# Patient Record
Sex: MALE | Race: WHITE | NOT HISPANIC OR LATINO | Employment: FULL TIME | URBAN - METROPOLITAN AREA
[De-identification: names, ages, dates, MRNs, and addresses within clinical notes are randomized per-mention and may not be internally consistent; named-entity substitution may affect disease eponyms.]

---

## 2017-01-23 DIAGNOSIS — C61 PROSTATE CANCER (HCC): Primary | ICD-10-CM

## 2017-01-24 ENCOUNTER — APPOINTMENT (OUTPATIENT)
Dept: LAB | Facility: HOSPITAL | Age: 52
End: 2017-01-24
Attending: RADIOLOGY

## 2017-01-24 PROCEDURE — 84153 ASSAY OF PSA TOTAL: CPT | Performed by: RADIOLOGY

## 2017-01-24 PROCEDURE — 36415 COLL VENOUS BLD VENIPUNCTURE: CPT | Performed by: RADIOLOGY

## 2017-01-24 PROCEDURE — 84154 ASSAY OF PSA FREE: CPT | Performed by: RADIOLOGY

## 2017-01-25 ENCOUNTER — OFFICE VISIT (OUTPATIENT)
Dept: RADIATION ONCOLOGY | Facility: HOSPITAL | Age: 52
End: 2017-01-25

## 2017-01-25 ENCOUNTER — HOSPITAL ENCOUNTER (OUTPATIENT)
Dept: RADIATION ONCOLOGY | Facility: HOSPITAL | Age: 52
Setting detail: RADIATION/ONCOLOGY SERIES
Discharge: HOME | End: 2017-01-25

## 2017-01-25 VITALS
DIASTOLIC BLOOD PRESSURE: 90 MMHG | BODY MASS INDEX: 31.33 KG/M2 | WEIGHT: 223.8 LBS | SYSTOLIC BLOOD PRESSURE: 145 MMHG | OXYGEN SATURATION: 94 % | RESPIRATION RATE: 16 BRPM | TEMPERATURE: 98 F | HEART RATE: 75 BPM | HEIGHT: 71 IN

## 2017-01-25 DIAGNOSIS — C61 PROSTATE CANCER (HCC): Primary | ICD-10-CM

## 2017-01-25 LAB
PSA FREE MFR SERPL: NORMAL %
PSA FREE SERPL-MCNC: <0.01 NG/ML
PSA SERPL-MCNC: <0.1 NG/ML (ref 0–4)

## 2017-01-25 PROCEDURE — G0463 HOSPITAL OUTPT CLINIC VISIT: HCPCS

## 2017-01-25 RX ORDER — GLUCOSAM/CHONDRO/HERB 149/HYAL 750-100 MG
TABLET ORAL
COMMUNITY
Start: 2016-06-01 | End: 2018-06-08

## 2017-01-25 NOTE — MR AVS SNAPSHOT
Abilio Wan   1/25/2017 2:45 PM   Office Visit    Dept Phone:  [phone]   Encounter #:  [id number]    Provider:  Royal Espinoza MD   Department:  [facility]                Your Full Care Plan              Today's Medication Changes          These changes are accurate as of: 1/25/17  3:15 PM.  If you have any questions, ask your nurse or doctor.               Stop taking medication(s)listed here:     bicalutamide 50 MG chemo tablet   Commonly known as:  CASODEX   Stopped by:  Royal Espinoza MD           dutasteride 0.5 MG capsule   Commonly known as:  AVODART   Stopped by:  Royal Espinoza MD           tamoxifen 20 MG chemo tablet   Commonly known as:  NOLVADEX   Stopped by:  Royal Espinoza MD                      Your Updated Medication List          This list is accurate as of: 1/25/17  3:15 PM.  Always use your most recent med list.                ASPIRIN ADULT LOW DOSE PO       CIALIS PO       GLUCOSAMINE CHOND COMPLEX/MSM tablet       LEVITRA PO       MULTIVITAMIN PO       VIAGRA PO       VITAMIN D PO               You Were Diagnosed With        Codes Comments    Prostate cancer    -  Primary ICD-10-CM: C61  ICD-9-CM: 185       Instructions     None    Patient Instructions History      Upcoming Appointments     Visit Type Date Time Department    RO FU > 6 MONTHS 1/25/2017  2:45 PM [facility]      [employer] Signup     Our records indicate that you have an active [employer] account.    You can view your After Visit Summary by going to [employer] and logging in with your [employer] username and password.  If you don't have a [employer] username and password but a parent or guardian has access to your record, the parent or guardian should login with their own [employer] username and password and access your record to view the After Visit Summary.    If you have questions, you can email [email or web address] or  "call [phone] to talk to our MyChart staff.  Remember, MyChart is NOT to be used for urgent needs.  For medical emergencies, dial 911.               Other Info from Your Visit           Allergies     No Known Allergies      Reason for Visit     Prostate Cancer IMRT prostate bed c/d 11/13/2015, last psa 1/24/17 was <0.1      Vital Signs     Blood Pressure Pulse Temperature Respirations Height Weight    145/90 75 98 °F (36.7 °C) (Oral) 16 71\" (180.3 cm) 223 lb 12.8 oz (102 kg)    Oxygen Saturation Body Mass Index Smoking Status             94% 31.21 kg/m2 Never Smoker         Problems and Diagnoses Noted     Prostate cancer        "

## 2017-01-25 NOTE — LETTER
2017     Fernando Pardo MD  [address]   [address]  [facility] [place] [address]    Patient: Abilio Wan   YOB: 1965   Date of Visit: 2017       Dear Dr. Char MD:    Abilio Wan was in my office today. Below is a copy of my note.    If you have questions, please do not hesitate to call me. I look forward to following Abilio along with you.         Sincerely,        Royal Espinoza MD        CC: No Recipients    FOLLOW UP NOTE    PATIENT:                                                      Abilio Wan  MEDICAL RECORD #:                        [id number]  :                                                          1965  COMPLETION DATE:   2015  DIAGNOSIS:     Prostate cancer    Staging form: Prostate, AJCC V7      Clinical stage from 2011: Stage III (rT3a, N0, M0, PSA: Less than 10, Harris 7)       BRIEF HISTORY:    Routine follow-up visit.  He has a history of prostate cancer, Harris's 6 status post prostatectomy but then developed biochemical recurrence treated with salvage pelvic irradiation and concurrent short course peripheral androgen blockade.  He continues to do well and denies any symptoms of fatigue, pain, bowel disturbance or new urinary symptoms.  PSA from 2017 is less than 0.1, undetectable.    MEDICATIONS: Medication reconciliation for the patient was reviewed and confirmed in the electronic medical record.    Review of Systems   All other systems reviewed and are negative.    IMPRESSION: IPSS Questionnaire (AUA-7):  Over the past month…     1) Incomplete Emptying  How often have you had a sensation of not emptying your bladder?  0 - Not at all   2) Frequency  How often have you had to urinate less than every two hours? 0 - Not at all   3) Intermittency  How often have you found you stopped and started again several times when you urinated?  0 - Not at all   4) Urgency  How often have you found it difficult to postpone  urination?  0 - Not at all   5) Weak Stream  How often have you had a weak urinary stream?  0 - Not at all   6) Straining  How often have you had to push or strain to begin urination?  0 - Not at all   7) Nocturia  How many times did you typically get up at night to urinate?  0 - None   Total Score: 0         Quality of life due to urinary symptoms:  If you were to spend the rest of your life with your urinary condition the way it is now, how would you feel about that? 0-Delighted   Urine Leakage (Incontinence) 0-No Leakage      Sexual Health Inventory  Current Status     1) How do you rate your confidence that you could achieve and keep an erection? 4-High   2) When you had erections with sexual stimulation, how often were your erections hard enough for penetration (entering your partner)? 4-Most times (much more than half the time)   3) During sexual intercourse, how often were you able to maintain your erection after you had penetrated (entered) into your partner? 3-Sometimes (about half the time)   4) During sexual intercourse, how difficult was it to maintain your erection to completion of intercourse? 5-Not difficult   5) When you attempted sexual intercourse, how often was it satisfactory to you? 5-Almost always or always   Total Score: 23         Bowel Health Inventory  Current Status: 0-No problems, no rectal bleeding, no discharge, less then 5 bowel movements a day          KPS 90%    Physical Exam   Constitutional: He is oriented to person, place, and time. He appears well-developed and well-nourished.   HENT:   Head: Normocephalic and atraumatic.   Neck: Normal range of motion. Neck supple.   Cardiovascular: Normal rate, regular rhythm and normal heart sounds.    No murmur heard.  Pulmonary/Chest: Effort normal and breath sounds normal. He has no wheezes. He has no rales.   Abdominal: Soft. Bowel sounds are normal. He exhibits no distension. There is no hepatosplenomegaly. There is no tenderness.  "  Musculoskeletal: Normal range of motion. He exhibits no edema or tenderness.   Lymphadenopathy:     He has no cervical adenopathy.     He has no axillary adenopathy.        Right: No supraclavicular adenopathy present.        Left: No supraclavicular adenopathy present.   Neurological: He is alert and oriented to person, place, and time. He has normal strength. No sensory deficit.   Skin: Skin is warm and dry.   Psychiatric: He has a normal mood and affect. His behavior is normal. Judgment and thought content normal.   Nursing note and vitals reviewed.      VITAL SIGNS:   Vitals:    01/25/17 1448   BP: 145/90   Pulse: 75   Resp: 16   Temp: 98 °F (36.7 °C)   TempSrc: Oral   SpO2: 94%   Weight: 223 lb 12.8 oz (102 kg)   Height: 71\" (180.3 cm)   PainSc: 0-No pain       The following portions of the patient's history were reviewed and updated as appropriate: allergies, current medications, past family history, past medical history, past social history, past surgical history and problem list.         Prostate cancer [C61]    IMPRESSION:  More than year after salvage pelvic irradiation for biochemical recurrence of prostate cancer he is doing well and is again in clinical remission.    RECOMMENDATIONS:  Repeat PSA in 6 months.     Return in about 6 months (around 7/25/2017) for Labs - See Treatment Plan, Office Visit.     Royal Espinoza MD    Errors in dictation may reflect use of voice recognition software and not all errors in transcription may have been detected prior to signing.  "

## 2017-01-25 NOTE — PROGRESS NOTES
FOLLOW UP NOTE    PATIENT:                                                      Abilio Wan  MEDICAL RECORD #:                        [id number]  :                                                          1965  COMPLETION DATE:   2015  DIAGNOSIS:     Prostate cancer    Staging form: Prostate, AJCC V7      Clinical stage from 2011: Stage III (rT3a, N0, M0, PSA: Less than 10, [place] 7)       BRIEF HISTORY:    Routine follow-up visit.  He has a history of prostate cancer, Harris's 6 status post prostatectomy but then developed biochemical recurrence treated with salvage pelvic irradiation and concurrent short course peripheral androgen blockade.  He continues to do well and denies any symptoms of fatigue, pain, bowel disturbance or new urinary symptoms.  PSA from 2017 is less than 0.1, undetectable.    MEDICATIONS: Medication reconciliation for the patient was reviewed and confirmed in the electronic medical record.    Review of Systems   All other systems reviewed and are negative.    IMPRESSION: IPSS Questionnaire (AUA-7):  Over the past month…     1) Incomplete Emptying  How often have you had a sensation of not emptying your bladder?  0 - Not at all   2) Frequency  How often have you had to urinate less than every two hours? 0 - Not at all   3) Intermittency  How often have you found you stopped and started again several times when you urinated?  0 - Not at all   4) Urgency  How often have you found it difficult to postpone urination?  0 - Not at all   5) Weak Stream  How often have you had a weak urinary stream?  0 - Not at all   6) Straining  How often have you had to push or strain to begin urination?  0 - Not at all   7) Nocturia  How many times did you typically get up at night to urinate?  0 - None   Total Score: 0         Quality of life due to urinary symptoms:  If you were to spend the rest of your life with your urinary condition the way it is now, how would you feel about  that? 0-Delighted   Urine Leakage (Incontinence) 0-No Leakage      Sexual Health Inventory  Current Status     1) How do you rate your confidence that you could achieve and keep an erection? 4-High   2) When you had erections with sexual stimulation, how often were your erections hard enough for penetration (entering your partner)? 4-Most times (much more than half the time)   3) During sexual intercourse, how often were you able to maintain your erection after you had penetrated (entered) into your partner? 3-Sometimes (about half the time)   4) During sexual intercourse, how difficult was it to maintain your erection to completion of intercourse? 5-Not difficult   5) When you attempted sexual intercourse, how often was it satisfactory to you? 5-Almost always or always   Total Score: 23         Bowel Health Inventory  Current Status: 0-No problems, no rectal bleeding, no discharge, less then 5 bowel movements a day          KPS 90%    Physical Exam   Constitutional: He is oriented to person, place, and time. He appears well-developed and well-nourished.   HENT:   Head: Normocephalic and atraumatic.   Neck: Normal range of motion. Neck supple.   Cardiovascular: Normal rate, regular rhythm and normal heart sounds.    No murmur heard.  Pulmonary/Chest: Effort normal and breath sounds normal. He has no wheezes. He has no rales.   Abdominal: Soft. Bowel sounds are normal. He exhibits no distension. There is no hepatosplenomegaly. There is no tenderness.   Musculoskeletal: Normal range of motion. He exhibits no edema or tenderness.   Lymphadenopathy:     He has no cervical adenopathy.     He has no axillary adenopathy.        Right: No supraclavicular adenopathy present.        Left: No supraclavicular adenopathy present.   Neurological: He is alert and oriented to person, place, and time. He has normal strength. No sensory deficit.   Skin: Skin is warm and dry.   Psychiatric: He has a normal mood and affect. His  "behavior is normal. Judgment and thought content normal.   Nursing note and vitals reviewed.      VITAL SIGNS:   Vitals:    01/25/17 1448   BP: 145/90   Pulse: 75   Resp: 16   Temp: 98 °F (36.7 °C)   TempSrc: Oral   SpO2: 94%   Weight: 223 lb 12.8 oz (102 kg)   Height: 71\" (180.3 cm)   PainSc: 0-No pain       The following portions of the patient's history were reviewed and updated as appropriate: allergies, current medications, past family history, past medical history, past social history, past surgical history and problem list.         Prostate cancer [C61]    IMPRESSION:  More than year after salvage pelvic irradiation for biochemical recurrence of prostate cancer he is doing well and is again in clinical remission.    RECOMMENDATIONS:  Repeat PSA in 6 months.     Return in about 6 months (around 7/25/2017) for Labs - See Treatment Plan, Office Visit.     Royal Espinoza MD    Errors in dictation may reflect use of voice recognition software and not all errors in transcription may have been detected prior to signing.  "

## 2017-07-26 ENCOUNTER — OFFICE VISIT (OUTPATIENT)
Dept: RADIATION ONCOLOGY | Facility: HOSPITAL | Age: 52
End: 2017-07-26

## 2017-07-26 ENCOUNTER — HOSPITAL ENCOUNTER (OUTPATIENT)
Dept: RADIATION ONCOLOGY | Facility: HOSPITAL | Age: 52
Setting detail: RADIATION/ONCOLOGY SERIES
Discharge: HOME | End: 2017-07-26

## 2017-07-26 VITALS
HEART RATE: 68 BPM | RESPIRATION RATE: 20 BRPM | OXYGEN SATURATION: 95 % | BODY MASS INDEX: 30.47 KG/M2 | SYSTOLIC BLOOD PRESSURE: 137 MMHG | WEIGHT: 218.5 LBS | DIASTOLIC BLOOD PRESSURE: 92 MMHG | TEMPERATURE: 97 F

## 2017-07-26 DIAGNOSIS — C61 PROSTATE CANCER (HCC): Primary | ICD-10-CM

## 2017-07-26 LAB — PSA SERPL-MCNC: 0.03 NG/ML (ref 0–4)

## 2017-07-26 PROCEDURE — 84153 ASSAY OF PSA TOTAL: CPT | Performed by: RADIOLOGY

## 2017-07-26 PROCEDURE — G0463 HOSPITAL OUTPT CLINIC VISIT: HCPCS

## 2017-07-26 NOTE — PROGRESS NOTES
FOLLOW UP NOTE    PATIENT:                                                      Abilio Wan  MEDICAL RECORD #:                        [id number]  :                                                          1965  COMPLETION DATE:    11/13/15  DIAGNOSIS:     Prostate cancer    Staging form: Prostate, AJCC V7      Clinical stage from 2011: Stage III (rT3a, N0, M0, PSA: Less than 10, Harris 7) - Signed by Royal Espinoza MD on 2016      BRIEF HISTORY:    Routine follow-up visit.  He is status post pelvic irradiation for biochemical recurrence after prostatectomy.  He's doing well with no complaints or symptoms at all.  Energy is good.  He is working, golfing and very active.  Androgen ablation injection appears to have worn off but he has not had testosterone retested.  He denies any hot flashes, pains or other complaints.  Bowel and bladder function is normal.  No incontinence.  No ED.    PSA drawn today was 0.030 ng/ml.  The previous 2 PSA values had been less than 0.1 ng/ml.    MEDICATIONS: Medication reconciliation for the patient was reviewed and confirmed in the electronic medical record.    Review of Systems   Constitutional: Negative.    HENT:   Positive for hearing loss.    Eyes: Negative.    Respiratory: Negative.    Cardiovascular: Negative.    Gastrointestinal: Negative.    Endocrine: Negative.    Genitourinary: Negative.     Musculoskeletal: Negative.    Skin: Negative.    Neurological: Negative.    Hematological: Negative.    Psychiatric/Behavioral: Negative.         IMPRESSION: IPSS Questionnaire (AUA-7):  Over the past month…      1) Incomplete Emptying  How often have you had a sensation of not emptying your bladder?  0 - Not at all   2) Frequency  How often have you had to urinate less than every two hours? 0 - Not at all   3) Intermittency  How often have you found you stopped and started again several times when you urinated?  0 - Not at all   4) Urgency  How often have you  found it difficult to postpone urination?  0 - Not at all   5) Weak Stream  How often have you had a weak urinary stream?  0 - Not at all   6) Straining  How often have you had to push or strain to begin urination?  0 - Not at all   7) Nocturia  How many times did you typically get up at night to urinate?  0 - None   Total Score: 0           Quality of life due to urinary symptoms:  If you were to spend the rest of your life with your urinary condition the way it is now, how would you feel about that? 0-Delighted   Urine Leakage (Incontinence) 0-No Leakage       Sexual Health Inventory  Current Status      1) How do you rate your confidence that you could achieve and keep an erection? 4-High   2) When you had erections with sexual stimulation, how often were your erections hard enough for penetration (entering your partner)? 4-Most times (much more than half the time)   3) During sexual intercourse, how often were you able to maintain your erection after you had penetrated (entered) into your partner? 3-Sometimes (about half the time)   4) During sexual intercourse, how difficult was it to maintain your erection to completion of intercourse? 5-Not difficult   5) When you attempted sexual intercourse, how often was it satisfactory to you? 5-Almost always or always   Total Score: 23           Bowel Health Inventory  Current Status: 0-No problems, no rectal bleeding, no discharge, less then 5 bowel movements a day               KPS 90%    Physical Exam   Constitutional: He is oriented to person, place, and time. He appears well-developed and well-nourished.   HENT:   Head: Normocephalic and atraumatic.   Neck: Normal range of motion. Neck supple.   Cardiovascular: Normal rate, regular rhythm and normal heart sounds.    No murmur heard.  Pulmonary/Chest: Effort normal and breath sounds normal. He has no wheezes. He has no rales.   Abdominal: Soft. Bowel sounds are normal. He exhibits no distension. There is no  hepatosplenomegaly. There is no tenderness.   Genitourinary: Rectal exam shows no internal hemorrhoid, no mass, no tenderness and anal tone normal. External hemorrhoid: external hemorrhoidal tissue is noninflamed. Prostate is not enlarged (prostate bed is smooth, soft without nodules) and not tender.   Musculoskeletal: Normal range of motion. He exhibits no edema or tenderness.   Lymphadenopathy:     He has no cervical adenopathy.     He has no axillary adenopathy.        Right: No supraclavicular adenopathy present.        Left: No supraclavicular adenopathy present.   Neurological: He is alert and oriented to person, place, and time. He has normal strength. No sensory deficit.   Skin: Skin is warm and dry.   Psychiatric: He has a normal mood and affect. His behavior is normal. Judgment and thought content normal.   Nursing note and vitals reviewed.      VITAL SIGNS:   Vitals:    07/26/17 0941   BP: 137/92   Pulse: 68   Resp: 20   Temp: 97 °F (36.1 °C)   TempSrc: Temporal Artery    SpO2: 95%   Weight: 218 lb 8 oz (99.1 kg)   PainSc: 0-No pain       The following portions of the patient's history were reviewed and updated as appropriate: allergies, current medications, past family history, past medical history, past social history, past surgical history and problem list.         Abilio was seen today for prostate cancer.    Diagnoses and all orders for this visit:    Prostate cancer  -     PSA         IMPRESSION:  Prostate cancer, biochemical recurrence, one half years after salvage pelvic irradiation with short course of androgen ablation.  He's clinically doing well with no symptoms or complaints.  PSA is barely detectable with our new laboratory standard.  It had previously been reported as less than 0.1 ng/ml and now has a value 0.030 ng/ml.    RECOMMENDATIONS:  Repeat PSA in 3 months to confirm stability.    Return in about 3 months (around 10/26/2017) for Office Visit, Labs - See Treatment Plan.    Royal VASQUES  MD [place]    Errors in dictation may reflect use of voice recognition software and not all errors in transcription may have been detected prior to signing.

## 2017-10-23 DIAGNOSIS — C61 PROSTATE CANCER (HCC): Primary | ICD-10-CM

## 2017-10-24 ENCOUNTER — LAB (OUTPATIENT)
Dept: LAB | Facility: HOSPITAL | Age: 52
End: 2017-10-24

## 2017-10-24 DIAGNOSIS — C61 PROSTATE CANCER (HCC): ICD-10-CM

## 2017-10-24 LAB — PSA SERPL-MCNC: 0.07 NG/ML (ref 0–4)

## 2017-10-24 PROCEDURE — 36415 COLL VENOUS BLD VENIPUNCTURE: CPT

## 2017-10-24 PROCEDURE — 84153 ASSAY OF PSA TOTAL: CPT | Performed by: RADIOLOGY

## 2017-10-26 ENCOUNTER — OFFICE VISIT (OUTPATIENT)
Dept: RADIATION ONCOLOGY | Facility: HOSPITAL | Age: 52
End: 2017-10-26

## 2017-10-26 ENCOUNTER — HOSPITAL ENCOUNTER (OUTPATIENT)
Dept: RADIATION ONCOLOGY | Facility: HOSPITAL | Age: 52
Setting detail: RADIATION/ONCOLOGY SERIES
Discharge: HOME | End: 2017-10-26

## 2017-10-26 VITALS
OXYGEN SATURATION: 97 % | DIASTOLIC BLOOD PRESSURE: 100 MMHG | BODY MASS INDEX: 31.52 KG/M2 | RESPIRATION RATE: 20 BRPM | HEART RATE: 85 BPM | TEMPERATURE: 97.3 F | WEIGHT: 226 LBS | SYSTOLIC BLOOD PRESSURE: 151 MMHG

## 2017-10-26 DIAGNOSIS — C61 PROSTATE CANCER (HCC): Primary | ICD-10-CM

## 2017-10-26 PROCEDURE — G0463 HOSPITAL OUTPT CLINIC VISIT: HCPCS

## 2017-10-26 NOTE — PROGRESS NOTES
FOLLOW UP NOTE    PATIENT:                                                      Abilio Wan  MEDICAL RECORD #:                        [id number]  :                                                          1965  COMPLETION DATE:    11/13/15  DIAGNOSIS:     Prostate cancer    Staging form: Prostate, AJCC V7    - Clinical stage from 2011: Stage III (rT3a, N0, M0, PSA: Less than 10, Harris 7) - Signed by Royal Espinoza MD on 2016      BRIEF HISTORY:    Routine follow-up visit.  He status post prostatectomy 2011.  He developed subsequent biochemical recurrence and received salvage pelvic irradiation with short course hormonal therapy completing 2015.  He feels well and has no complaints.  Recent PSA was 0.07 ng/ml.    PSA 3 months ago was 0.03 ng/ml.    MEDICATIONS: Medication reconciliation for the patient was reviewed and confirmed in the electronic medical record.    Review of Systems   Constitutional: Negative.    HENT:   Positive for hearing loss and tinnitus.    Eyes: Negative.    Respiratory: Negative.    Cardiovascular: Negative.    Gastrointestinal: Negative.    Endocrine: Negative.    Genitourinary: Negative.     Musculoskeletal: Negative.    Skin: Negative.    Neurological: Negative.    Hematological: Negative.    Psychiatric/Behavioral: Negative.           IMPRESSION: IPSS Questionnaire (AUA-7):  Over the past month…      1) Incomplete Emptying  How often have you had a sensation of not emptying your bladder?  0 - Not at all   2) Frequency  How often have you had to urinate less than every two hours? 0 - Not at all   3) Intermittency  How often have you found you stopped and started again several times when you urinated?  0 - Not at all   4) Urgency  How often have you found it difficult to postpone urination?  0 - Not at all   5) Weak Stream  How often have you had a weak urinary stream?  0 - Not at all   6) Straining  How often have you had to push or strain to  begin urination?  0 - Not at all   7) Nocturia  How many times did you typically get up at night to urinate?  0 - None   Total Score: 0           Quality of life due to urinary symptoms:  If you were to spend the rest of your life with your urinary condition the way it is now, how would you feel about that? 0-Delighted   Urine Leakage (Incontinence) 0-No Leakage       Sexual Health Inventory  Current Status      1) How do you rate your confidence that you could achieve and keep an erection? 4-High   2) When you had erections with sexual stimulation, how often were your erections hard enough for penetration (entering your partner)? 4-Most times (much more than half the time)   3) During sexual intercourse, how often were you able to maintain your erection after you had penetrated (entered) into your partner? 3-Sometimes (about half the time)   4) During sexual intercourse, how difficult was it to maintain your erection to completion of intercourse? 5-Not difficult   5) When you attempted sexual intercourse, how often was it satisfactory to you? 5-Almost always or always   Total Score: 23           Bowel Health Inventory  Current Status: 0-No problems, no rectal bleeding, no discharge, less then 5 bowel movements a day                KPS 90%    Physical Exam   Constitutional: He is oriented to person, place, and time. He appears well-developed and well-nourished.   HENT:   Head: Normocephalic and atraumatic.   Neck: Normal range of motion. Neck supple.   Cardiovascular: Normal rate, regular rhythm and normal heart sounds.    No murmur heard.  Pulmonary/Chest: Effort normal and breath sounds normal. He has no wheezes. He has no rales.   Abdominal: Soft. Bowel sounds are normal. He exhibits no distension. There is no hepatosplenomegaly. There is no tenderness.   Musculoskeletal: Normal range of motion. He exhibits no edema or tenderness.   Lymphadenopathy:     He has no cervical adenopathy.     He has no axillary  adenopathy.        Right: No supraclavicular adenopathy present.        Left: No supraclavicular adenopathy present.   Neurological: He is alert and oriented to person, place, and time. He has normal strength. No sensory deficit.   Skin: Skin is warm and dry.   Psychiatric: He has a normal mood and affect. His behavior is normal. Judgment and thought content normal.   Nursing note and vitals reviewed.      VITAL SIGNS:   Vitals:    10/26/17 0924   BP: 151/100   Pulse: 85   Resp: 20   Temp: 97.3 °F (36.3 °C)   TempSrc: Temporal Artery    SpO2: 97%   Weight: 226 lb (103 kg)   PainSc: 0-No pain       The following portions of the patient's history were reviewed and updated as appropriate: allergies, current medications, past family history, past medical history, past social history, past surgical history and problem list.         Abilio was seen today for prostate cancer.    Diagnoses and all orders for this visit:    Prostate cancer  -     PSA; Future       IMPRESSION:  Prostate cancer, almost 6 years after prostatectomy and not quite 2 years after salvage pelvic irradiation/short course androgen ablation.  PSA is extremely low and barely detectable with very sensitive assay.  Radiographic workup would not likely be able to accurately detect the source of this PSA, even with new or PET/CT modalities.  Imaging studies are not indicated at this time.  Treatment intervention is not warranted.    RECOMMENDATIONS:  Continue surveillance with repeat PSA testing in 3 months.    Return in about 3 months (around 1/25/2018) for Labs - See Treatment Plan, Office Visit.    Royal Espinoza MD    Errors in dictation may reflect use of voice recognition software and not all errors in transcription may have been detected prior to signing.

## 2018-01-09 DIAGNOSIS — C61 PROSTATE CANCER (HCC): Primary | ICD-10-CM

## 2018-01-09 RX ORDER — SILDENAFIL 100 MG/1
100 TABLET, FILM COATED ORAL DAILY PRN
Qty: 30 TABLET | Refills: 11 | Status: SHIPPED | OUTPATIENT
Start: 2018-01-09 | End: 2018-07-17 | Stop reason: SDUPTHER

## 2018-01-29 ENCOUNTER — LAB (OUTPATIENT)
Dept: LAB | Facility: HOSPITAL | Age: 53
End: 2018-01-29

## 2018-01-29 DIAGNOSIS — C61 PROSTATE CANCER (HCC): Primary | ICD-10-CM

## 2018-01-29 DIAGNOSIS — C61 PROSTATE CANCER (HCC): ICD-10-CM

## 2018-01-29 LAB — PSA SERPL-MCNC: 0.02 NG/ML (ref 0–4)

## 2018-01-29 PROCEDURE — 36415 COLL VENOUS BLD VENIPUNCTURE: CPT

## 2018-01-29 PROCEDURE — 84153 ASSAY OF PSA TOTAL: CPT

## 2018-01-30 DIAGNOSIS — C61 PROSTATE CANCER (HCC): Primary | ICD-10-CM

## 2018-06-01 DIAGNOSIS — C61 PROSTATE CANCER (HCC): Primary | ICD-10-CM

## 2018-06-06 ENCOUNTER — LAB (OUTPATIENT)
Dept: LAB | Facility: HOSPITAL | Age: 53
End: 2018-06-06

## 2018-06-06 DIAGNOSIS — C61 PROSTATE CANCER (HCC): ICD-10-CM

## 2018-06-06 LAB — PSA SERPL-MCNC: 0.04 NG/ML (ref 0–4)

## 2018-06-06 PROCEDURE — G0103 PSA SCREENING: HCPCS

## 2018-06-06 PROCEDURE — 36415 COLL VENOUS BLD VENIPUNCTURE: CPT

## 2018-06-08 ENCOUNTER — OFFICE VISIT (OUTPATIENT)
Dept: RADIATION ONCOLOGY | Facility: HOSPITAL | Age: 53
End: 2018-06-08

## 2018-06-08 ENCOUNTER — HOSPITAL ENCOUNTER (OUTPATIENT)
Dept: RADIATION ONCOLOGY | Facility: HOSPITAL | Age: 53
Setting detail: RADIATION/ONCOLOGY SERIES
Discharge: HOME | End: 2018-06-08

## 2018-06-08 VITALS
TEMPERATURE: 97.1 F | SYSTOLIC BLOOD PRESSURE: 127 MMHG | HEART RATE: 71 BPM | RESPIRATION RATE: 20 BRPM | OXYGEN SATURATION: 97 % | BODY MASS INDEX: 30.34 KG/M2 | DIASTOLIC BLOOD PRESSURE: 78 MMHG | WEIGHT: 217.5 LBS

## 2018-06-08 DIAGNOSIS — C61 PROSTATE CANCER (HCC): Primary | ICD-10-CM

## 2018-06-08 PROCEDURE — G0463 HOSPITAL OUTPT CLINIC VISIT: HCPCS

## 2018-06-08 RX ORDER — SILDENAFIL CITRATE 20 MG/1
20 TABLET ORAL DAILY PRN
Qty: 90 TABLET | Refills: 3 | Status: SHIPPED | OUTPATIENT
Start: 2018-06-08 | End: 2019-01-07 | Stop reason: SDUPTHER

## 2018-06-08 NOTE — PROGRESS NOTES
FOLLOW UP NOTE    PATIENT:                                                      Abilio Wan  MEDICAL RECORD #:                        [id number]  :                                                          1965  COMPLETION DATE:   11/13/15  DIAGNOSIS:     Prostate cancer  Staging form: Prostate, AJCC V7  - Clinical stage from 2011: Stage III (rT3a, N0, M0, PSA: Less than 10, Harris 7)       BRIEF HISTORY:    Routine follow-up visit.   He is status post prostatectomy 2011.    He developed subsequent biochemical recurrence with PSA 0.21 ng/ml.    He was treated with salvage pelvic irradiation with short course hormonal therapy completing 2015.  He feels well and has no complaints.  PSA:  0.07 ng/ml 10-24-17  0.02 ng/ml  18  0.04 ng/ml   18     MEDICATIONS: Medication reconciliation for the patient was reviewed and confirmed in the electronic medical record.    Review of Systems   Constitutional: Negative.    HENT:  Negative.    Eyes: Negative.    Respiratory: Negative.    Cardiovascular: Negative.    Gastrointestinal: Negative.    Endocrine: Negative.    Genitourinary: Negative.     Musculoskeletal: Negative.    Skin: Negative.    Neurological: Negative.    Hematological: Negative.    Psychiatric/Behavioral: Negative.          IMPRESSION: IPSS Questionnaire (AUA-7):  Over the past month…      1) Incomplete Emptying  How often have you had a sensation of not emptying your bladder?  0 - Not at all   2) Frequency  How often have you had to urinate less than every two hours? 0 - Not at all   3) Intermittency  How often have you found you stopped and started again several times when you urinated?  0 - Not at all   4) Urgency  How often have you found it difficult to postpone urination?  0 - Not at all   5) Weak Stream  How often have you had a weak urinary stream?  0 - Not at all   6) Straining  How often have you had to push or strain to begin urination?  0 - Not at all   7)  Nocturia  How many times did you typically get up at night to urinate?  0 - None   Total Score: 0           Quality of life due to urinary symptoms:  If you were to spend the rest of your life with your urinary condition the way it is now, how would you feel about that? 0-Delighted   Urine Leakage (Incontinence) 0-No Leakage       Sexual Health Inventory  Current Status      1) How do you rate your confidence that you could achieve and keep an erection? 4-High   2) When you had erections with sexual stimulation, how often were your erections hard enough for penetration (entering your partner)? 4-Most times (much more than half the time)   3) During sexual intercourse, how often were you able to maintain your erection after you had penetrated (entered) into your partner? 3-Sometimes (about half the time)   4) During sexual intercourse, how difficult was it to maintain your erection to completion of intercourse? 5-Not difficult   5) When you attempted sexual intercourse, how often was it satisfactory to you? 5-Almost always or always   Total Score: 23           Bowel Health Inventory  Current Status: 0-No problems, no rectal bleeding, no discharge, less then 5 bowel movements a day              KPS 90%    Physical Exam   Constitutional: He is oriented to person, place, and time. He appears well-developed and well-nourished.   HENT:   Head: Normocephalic and atraumatic.   Neck: Normal range of motion. Neck supple.   Cardiovascular: Normal rate, regular rhythm and normal heart sounds.    No murmur heard.  Pulmonary/Chest: Effort normal and breath sounds normal. He has no wheezes. He has no rales.   Abdominal: Soft. Bowel sounds are normal. He exhibits no distension. There is no hepatosplenomegaly. There is no tenderness.   Genitourinary: Rectal exam shows external hemorrhoid (noninflamed tags). Rectal exam shows no mass and no tenderness. Prostate is not enlarged (prostate bed is flat, smooth, no nodules.) and not  tender.   Musculoskeletal: Normal range of motion. He exhibits no edema or tenderness.   Lymphadenopathy:     He has no cervical adenopathy.     He has no axillary adenopathy.        Right: No supraclavicular adenopathy present.        Left: No supraclavicular adenopathy present.   Neurological: He is alert and oriented to person, place, and time. He has normal strength. No sensory deficit.   Skin: Skin is warm and dry.   Psychiatric: He has a normal mood and affect. His behavior is normal. Judgment and thought content normal.   Nursing note and vitals reviewed.      VITAL SIGNS:   Vitals:    06/08/18 0821   BP: 127/78   Pulse: 71   Resp: 20   Temp: 97.1 °F (36.2 °C)   TempSrc: Temporal Artery    SpO2: 97%   Weight: 98.7 kg (217 lb 8 oz)   PainSc: 0-No pain       The following portions of the patient's history were reviewed and updated as appropriate: allergies, current medications, past family history, past medical history, past social history, past surgical history and problem list.         Abilio was seen today for prostate cancer.    Diagnoses and all orders for this visit:    Prostate cancer    Other orders  -     sildenafil (REVATIO) 20 MG tablet; Take 1 tablet by mouth Daily As Needed (1-5 as directed).      IMPRESSION:  Prostate cancer, almost 7 years after prostatectomy and 2 1/2 years after salvage pelvic irradiation/short course androgen ablation.  PSA is extremely low and barely detectable with very sensitive assay.  Radiographic workup would not likely be able to accurately detect the source of this PSA, even with new or PET/CT modalities.  Imaging studies are not indicated at this time.  Treatment intervention is not warranted.    RECOMMENDATIONS:  Continue surveillance with repeat PSA testing and follow-up in 6 months.    Return in about 6 months (around 12/8/2018) for Labs - See Treatment Plan, Office Visit.    Royal Espinoza MD    Errors in dictation may reflect use of voice recognition software  and not all errors in transcription may have been detected prior to signing.

## 2018-07-17 ENCOUNTER — OFFICE VISIT (OUTPATIENT)
Dept: FAMILY MEDICINE CLINIC | Facility: CLINIC | Age: 53
End: 2018-07-17

## 2018-07-17 VITALS
HEIGHT: 71 IN | OXYGEN SATURATION: 98 % | TEMPERATURE: 97.8 F | BODY MASS INDEX: 30.94 KG/M2 | WEIGHT: 221 LBS | HEART RATE: 73 BPM | DIASTOLIC BLOOD PRESSURE: 80 MMHG | SYSTOLIC BLOOD PRESSURE: 122 MMHG

## 2018-07-17 DIAGNOSIS — Z92.3 S/P RADIOTHERAPY: ICD-10-CM

## 2018-07-17 DIAGNOSIS — Z85.46 HISTORY OF PROSTATE CANCER: Primary | ICD-10-CM

## 2018-07-17 DIAGNOSIS — Z90.79 S/P PROSTATECTOMY: ICD-10-CM

## 2018-07-17 PROCEDURE — 99214 OFFICE O/P EST MOD 30 MIN: CPT | Performed by: FAMILY MEDICINE

## 2018-07-17 NOTE — PROGRESS NOTES
Subjective   Abilio Wan is a 53 y.o. male    Patient presents today to establish care.  He is a former patient of Dr. Pardo.  Significant history is that of prostate cancer status post prostatectomy with chemical recurrence followed by radiation therapy.  He has no other chronic medical problems.  Previous surgery history includes amputation  of toe of the right foot, tonsillectomy, vasectomy and repair of Achilles heel tear.  Patient had a colonoscopy in 2015.  He has a history of colon polyps.  He has no acute medical complaints today.  He is due to schedule an annual physical exam which we get scheduled today.  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    Medical history notes reviewed.        The following portions of the patient's history were reviewed and updated as appropriate: allergies, current medications, past social history and problem list    Review of Systems   Constitutional: Negative.    HENT: Negative.    Respiratory: Negative.    Cardiovascular: Negative.    Gastrointestinal: Negative.    Genitourinary: Negative.    Musculoskeletal: Negative.    Psychiatric/Behavioral: Negative.        Objective     Vitals:    07/17/18 1300   BP: 122/80   Pulse: 73   Temp: 97.8 °F (36.6 °C)   SpO2: 98%       Physical Exam   Constitutional: He is oriented to person, place, and time. He appears well-developed and well-nourished.   HENT:   Head: Normocephalic.   Mouth/Throat: Oropharynx is clear and moist.   Eyes: Pupils are equal, round, and reactive to light. Conjunctivae are normal.   Cardiovascular: Normal rate and regular rhythm.    Pulmonary/Chest: Effort normal and breath sounds normal.   Neurological: He is alert and oriented to person, place, and time.   Skin: Skin is warm and dry.   Psychiatric: He has a normal mood and affect. His behavior is normal.   Nursing note and vitals reviewed.      Assessment/Plan   Problem List Items Addressed This Visit     None      Visit Diagnoses      History of prostate cancer    -  Primary    S/P prostatectomy        S/P radiotherapy

## 2018-09-13 ENCOUNTER — LAB (OUTPATIENT)
Dept: LAB | Facility: HOSPITAL | Age: 53
End: 2018-09-13

## 2018-09-13 ENCOUNTER — OFFICE VISIT (OUTPATIENT)
Dept: FAMILY MEDICINE CLINIC | Facility: CLINIC | Age: 53
End: 2018-09-13

## 2018-09-13 VITALS
HEIGHT: 71 IN | SYSTOLIC BLOOD PRESSURE: 120 MMHG | BODY MASS INDEX: 30.94 KG/M2 | WEIGHT: 221 LBS | DIASTOLIC BLOOD PRESSURE: 74 MMHG | TEMPERATURE: 98 F | OXYGEN SATURATION: 98 % | HEART RATE: 65 BPM

## 2018-09-13 DIAGNOSIS — Z11.59 NEED FOR HEPATITIS C SCREENING TEST: ICD-10-CM

## 2018-09-13 DIAGNOSIS — Z00.00 ROUTINE GENERAL MEDICAL EXAMINATION AT A HEALTH CARE FACILITY: ICD-10-CM

## 2018-09-13 DIAGNOSIS — Z85.46 HISTORY OF PROSTATE CANCER: ICD-10-CM

## 2018-09-13 DIAGNOSIS — Z00.00 ROUTINE GENERAL MEDICAL EXAMINATION AT A HEALTH CARE FACILITY: Primary | ICD-10-CM

## 2018-09-13 LAB
ALBUMIN SERPL-MCNC: 4.65 G/DL (ref 3.2–4.8)
ALBUMIN/GLOB SERPL: 2.4 G/DL (ref 1.5–2.5)
ALP SERPL-CCNC: 78 U/L (ref 25–100)
ALT SERPL W P-5'-P-CCNC: 23 U/L (ref 7–40)
ANION GAP SERPL CALCULATED.3IONS-SCNC: 9 MMOL/L (ref 3–11)
ARTICHOKE IGE QN: 135 MG/DL (ref 0–130)
AST SERPL-CCNC: 22 U/L (ref 0–33)
BILIRUB SERPL-MCNC: 0.5 MG/DL (ref 0.3–1.2)
BUN BLD-MCNC: 14 MG/DL (ref 9–23)
BUN/CREAT SERPL: 17.3 (ref 7–25)
CALCIUM SPEC-SCNC: 9.3 MG/DL (ref 8.7–10.4)
CHLORIDE SERPL-SCNC: 102 MMOL/L (ref 99–109)
CHOLEST SERPL-MCNC: 188 MG/DL (ref 0–200)
CO2 SERPL-SCNC: 27 MMOL/L (ref 20–31)
CREAT BLD-MCNC: 0.81 MG/DL (ref 0.6–1.3)
DEPRECATED RDW RBC AUTO: 38.3 FL (ref 37–54)
ERYTHROCYTE [DISTWIDTH] IN BLOOD BY AUTOMATED COUNT: 12.8 % (ref 11.3–14.5)
GFR SERPL CREATININE-BSD FRML MDRD: 100 ML/MIN/1.73
GLOBULIN UR ELPH-MCNC: 2 GM/DL
GLUCOSE BLD-MCNC: 77 MG/DL (ref 70–100)
HCT VFR BLD AUTO: 45.4 % (ref 38.9–50.9)
HCV AB SER DONR QL: NORMAL
HDLC SERPL-MCNC: 44 MG/DL (ref 40–60)
HGB BLD-MCNC: 15.6 G/DL (ref 13.1–17.5)
MCH RBC QN AUTO: 28.5 PG (ref 27–31)
MCHC RBC AUTO-ENTMCNC: 34.4 G/DL (ref 32–36)
MCV RBC AUTO: 82.8 FL (ref 80–99)
PLATELET # BLD AUTO: 243 10*3/MM3 (ref 150–450)
PMV BLD AUTO: 10.2 FL (ref 6–12)
POTASSIUM BLD-SCNC: 4.2 MMOL/L (ref 3.5–5.5)
PROT SERPL-MCNC: 6.6 G/DL (ref 5.7–8.2)
RBC # BLD AUTO: 5.48 10*6/MM3 (ref 4.2–5.76)
SODIUM BLD-SCNC: 138 MMOL/L (ref 132–146)
T4 FREE SERPL-MCNC: 1.28 NG/DL (ref 0.89–1.76)
TRIGL SERPL-MCNC: 158 MG/DL (ref 0–150)
TSH SERPL DL<=0.05 MIU/L-ACNC: 1.89 MIU/ML (ref 0.35–5.35)
WBC NRBC COR # BLD: 3.74 10*3/MM3 (ref 3.5–10.8)

## 2018-09-13 PROCEDURE — 80053 COMPREHEN METABOLIC PANEL: CPT

## 2018-09-13 PROCEDURE — 85027 COMPLETE CBC AUTOMATED: CPT

## 2018-09-13 PROCEDURE — 80061 LIPID PANEL: CPT

## 2018-09-13 PROCEDURE — 84439 ASSAY OF FREE THYROXINE: CPT

## 2018-09-13 PROCEDURE — 99396 PREV VISIT EST AGE 40-64: CPT | Performed by: FAMILY MEDICINE

## 2018-09-13 PROCEDURE — 36415 COLL VENOUS BLD VENIPUNCTURE: CPT

## 2018-09-13 PROCEDURE — 86803 HEPATITIS C AB TEST: CPT

## 2018-09-13 PROCEDURE — 84443 ASSAY THYROID STIM HORMONE: CPT

## 2018-09-13 NOTE — PROGRESS NOTES
Subjective   Abilio Wan is a 53 y.o. male    Chief Complaint  Annual physical exam    History of Present Illness  Patient presents today for annual physical examination.  He has no acute medical problems or complaints.  He has a history of prostate cancer and is status post prostatectomy.  He had a biochemical recurrence and underwent radiation therapy and a short course of hormonal ablation therapy.  He is followed closely by Dr. Riojas in radiation medicine.  Health maintenance issues are reviewed and discussed with the patient.  We will obtain labs when he is fasting.  He is advised to get a flu shot in October.    The following portions of the patient's history were reviewed and updated as appropriate: allergies, current medications, past social history and problem list    Review of Systems   Constitutional: Negative.    HENT: Negative.    Eyes: Negative.    Respiratory: Negative.    Cardiovascular: Negative.    Gastrointestinal: Negative.    Endocrine: Negative.    Genitourinary: Negative.    Musculoskeletal: Negative.    Skin: Negative.    Allergic/Immunologic: Negative.    Neurological: Negative.    Hematological: Negative.    Psychiatric/Behavioral: Negative.    All other systems reviewed and are negative.      Objective     Vitals:    09/13/18 0904   BP: 120/74   Pulse: 65   Temp: 98 °F (36.7 °C)   SpO2: 98%       Physical Exam   Constitutional: He is oriented to person, place, and time. He appears well-developed and well-nourished.   HENT:   Head: Normocephalic and atraumatic.   Right Ear: External ear normal.   Left Ear: External ear normal.   Nose: Nose normal.   Mouth/Throat: Oropharynx is clear and moist.   Eyes: Pupils are equal, round, and reactive to light. Conjunctivae and EOM are normal.   Neck: Normal range of motion. Neck supple. No thyromegaly present.   Cardiovascular: Normal rate, regular rhythm, normal heart sounds and intact distal pulses.    No murmur heard.  Pulmonary/Chest:  Effort normal and breath sounds normal.   Abdominal: Soft. Bowel sounds are normal. He exhibits no mass. There is no tenderness.   Genitourinary: Rectum normal, prostate normal and penis normal.   Musculoskeletal: Normal range of motion. He exhibits no edema.   Neurological: He is alert and oriented to person, place, and time. He has normal reflexes. No cranial nerve deficit.   Skin: Skin is warm and dry.   Psychiatric: He has a normal mood and affect. His behavior is normal.       Assessment/Plan   Problem List Items Addressed This Visit     None      Visit Diagnoses     Routine general medical examination at a health care facility    -  Primary    Relevant Orders    CBC (No Diff)    Comprehensive Metabolic Panel    Lipid Panel    TSH    T4, Free    History of prostate cancer        Need for hepatitis C screening test        Relevant Orders    Hepatitis C Antibody        Patient is counseled during today's visit regarding preventative health measures to include use of seatbelts, medication safety, healthy diet and regular exercise.

## 2018-12-06 DIAGNOSIS — C61 PROSTATE CANCER (HCC): Primary | ICD-10-CM

## 2019-01-04 ENCOUNTER — LAB (OUTPATIENT)
Dept: LAB | Facility: HOSPITAL | Age: 54
End: 2019-01-04

## 2019-01-04 DIAGNOSIS — C61 PROSTATE CANCER (HCC): Primary | ICD-10-CM

## 2019-01-04 DIAGNOSIS — C61 PROSTATE CANCER (HCC): ICD-10-CM

## 2019-01-04 LAB — PSA SERPL-MCNC: 0.06 NG/ML (ref 0–4)

## 2019-01-04 PROCEDURE — 36415 COLL VENOUS BLD VENIPUNCTURE: CPT

## 2019-01-04 PROCEDURE — 84153 ASSAY OF PSA TOTAL: CPT

## 2019-01-07 ENCOUNTER — OFFICE VISIT (OUTPATIENT)
Dept: RADIATION ONCOLOGY | Facility: HOSPITAL | Age: 54
End: 2019-01-07

## 2019-01-07 ENCOUNTER — HOSPITAL ENCOUNTER (OUTPATIENT)
Dept: RADIATION ONCOLOGY | Facility: HOSPITAL | Age: 54
Setting detail: RADIATION/ONCOLOGY SERIES
Discharge: HOME | End: 2019-01-07

## 2019-01-07 VITALS
BODY MASS INDEX: 32.07 KG/M2 | WEIGHT: 229.1 LBS | DIASTOLIC BLOOD PRESSURE: 94 MMHG | HEART RATE: 74 BPM | OXYGEN SATURATION: 97 % | TEMPERATURE: 97.4 F | RESPIRATION RATE: 20 BRPM | SYSTOLIC BLOOD PRESSURE: 138 MMHG

## 2019-01-07 DIAGNOSIS — C61 PROSTATE CANCER (HCC): Primary | ICD-10-CM

## 2019-01-07 PROCEDURE — G0463 HOSPITAL OUTPT CLINIC VISIT: HCPCS

## 2019-01-07 RX ORDER — SILDENAFIL CITRATE 20 MG/1
20 TABLET ORAL DAILY PRN
Qty: 90 TABLET | Refills: 3 | Status: SHIPPED | OUTPATIENT
Start: 2019-01-07 | End: 2019-05-21 | Stop reason: SDUPTHER

## 2019-01-07 NOTE — PROGRESS NOTES
FOLLOW UP NOTE    PATIENT:                                                      Abilio Wan  MEDICAL RECORD #:                        [id number]  :                                                          1965  COMPLETION DATE:   11/13/15  DIAGNOSIS:     Prostate cancer (CMS/[facility])  Staging form: Prostate, AJCC V7  - Clinical stage from 2011: Stage III (rT3a, N0, M0, PSA: Less than 10, Harris 7) - Signed by Royal Espinoza MD on 2016      BRIEF HISTORY:    Routine follow-up visit.   He is status post prostatectomy 2011.    He developed subsequent biochemical recurrence with PSA 0.21 ng/ml.    He was treated with salvage pelvic irradiation with short course hormonal therapy completing 2015.  He feels well and has no complaints.  PSA:  0.07 ng/ml 10-24-17  0.02 ng/ml  18  0.04 ng/ml   18   0.06 ng/ml 19    MEDICATIONS: Medication reconciliation for the patient was reviewed and confirmed in the electronic medical record.    Review of Systems   Constitutional: Negative.    HENT:  Negative.    Eyes: Negative.    Respiratory: Negative.    Cardiovascular: Negative.    Gastrointestinal: Negative.    Endocrine: Negative.    Genitourinary: Negative.     Musculoskeletal: Negative.    Skin: Negative.    Neurological: Negative.    Hematological: Negative.    Psychiatric/Behavioral: Negative.          IMPRESSION: IPSS Questionnaire (AUA-7):  Over the past month…      1) Incomplete Emptying  How often have you had a sensation of not emptying your bladder?  0 - Not at all   2) Frequency  How often have you had to urinate less than every two hours? 0 - Not at all   3) Intermittency  How often have you found you stopped and started again several times when you urinated?  0 - Not at all   4) Urgency  How often have you found it difficult to postpone urination?  0 - Not at all   5) Weak Stream  How often have you had a weak urinary stream?  0 - Not at all   6) Straining  How often  have you had to push or strain to begin urination?  0 - Not at all   7) Nocturia  How many times did you typically get up at night to urinate?  0 - None   Total Score: 0           Quality of life due to urinary symptoms:  If you were to spend the rest of your life with your urinary condition the way it is now, how would you feel about that? 0-Delighted   Urine Leakage (Incontinence) 0-No Leakage       Sexual Health Inventory  Current Status      1) How do you rate your confidence that you could achieve and keep an erection? 4-High   2) When you had erections with sexual stimulation, how often were your erections hard enough for penetration (entering your partner)? 4-Most times (much more than half the time)   3) During sexual intercourse, how often were you able to maintain your erection after you had penetrated (entered) into your partner? 3-Sometimes (about half the time)   4) During sexual intercourse, how difficult was it to maintain your erection to completion of intercourse? 5-Not difficult   5) When you attempted sexual intercourse, how often was it satisfactory to you? 5-Almost always or always   Total Score: 23           Bowel Health Inventory  Current Status: 0-No problems, no rectal bleeding, no discharge, less then 5 bowel movements a day                         KPS 90%    Physical Exam   Constitutional: He is oriented to person, place, and time. He appears well-developed and well-nourished.   HENT:   Head: Normocephalic and atraumatic.   Neck: Normal range of motion. Neck supple.   Cardiovascular: Normal rate, regular rhythm and normal heart sounds.   No murmur heard.  Pulmonary/Chest: Effort normal and breath sounds normal. He has no wheezes. He has no rales.   Abdominal: Soft. Bowel sounds are normal. He exhibits no distension. There is no hepatosplenomegaly. There is no tenderness.   Musculoskeletal: Normal range of motion. He exhibits no edema or tenderness.   Lymphadenopathy:     He has no cervical  adenopathy.     He has no axillary adenopathy.        Right: No supraclavicular adenopathy present.        Left: No supraclavicular adenopathy present.   Neurological: He is alert and oriented to person, place, and time. He has normal strength. No sensory deficit.   Skin: Skin is warm and dry.   Psychiatric: He has a normal mood and affect. His behavior is normal. Judgment and thought content normal.   Nursing note and vitals reviewed.      VITAL SIGNS:   Vitals:    01/07/19 0905   BP: 138/94   Pulse: 74   Resp: 20   Temp: 97.4 °F (36.3 °C)   TempSrc: Temporal   SpO2: 97%   Weight: 104 kg (229 lb 1.6 oz)   PainSc: 0-No pain       The following portions of the patient's history were reviewed and updated as appropriate: allergies, current medications, past family history, past medical history, past social history, past surgical history and problem list.         Abilio was seen today for prostate cancer.    Diagnoses and all orders for this visit:    Prostate cancer (CMS/[facility])  -     PSA Diagnostic; Future    Other orders  -     sildenafil (REVATIO) 20 MG tablet; Take 1 tablet by mouth Daily As Needed (1-5 as directed).         IMPRESSION:  Prostate cancer, 7 years after prostatectomy and 3 years after salvage pelvic irradiation/short course androgen ablation.  PSA is still extremely low and barely detectable with very sensitive assay.  Radiographic workup would not likely be able to accurately detect the source of this PSA, even with new or PET/CT modalities.  Imaging studies are not indicated at this time.  Treatment intervention is not warranted.    RECOMMENDATIONS:  Continue surveillance with repeat PSA testing and follow-up in 6 months.    Return in about 6 months (around 7/7/2019) for Labs - See Treatment Plan, Office Visit.    Royal Espinoza MD    Errors in dictation may reflect use of voice recognition software and not all errors in transcription may have been detected prior to signing.

## 2019-02-04 ENCOUNTER — APPOINTMENT (OUTPATIENT)
Dept: RADIATION ONCOLOGY | Facility: HOSPITAL | Age: 54
End: 2019-02-04

## 2019-05-21 RX ORDER — SILDENAFIL CITRATE 20 MG/1
20 TABLET ORAL DAILY PRN
Qty: 90 TABLET | Refills: 3 | Status: SHIPPED | OUTPATIENT
Start: 2019-05-21 | End: 2019-05-22 | Stop reason: SDUPTHER

## 2019-05-22 RX ORDER — SILDENAFIL CITRATE 20 MG/1
20 TABLET ORAL AS NEEDED
Qty: 90 TABLET | Refills: 6 | Status: SHIPPED | OUTPATIENT
Start: 2019-05-22 | End: 2021-02-24 | Stop reason: SDUPTHER

## 2019-07-15 ENCOUNTER — HOSPITAL ENCOUNTER (OUTPATIENT)
Dept: RADIATION ONCOLOGY | Facility: HOSPITAL | Age: 54
Setting detail: RADIATION/ONCOLOGY SERIES
Discharge: HOME | End: 2019-07-15

## 2019-07-15 ENCOUNTER — OFFICE VISIT (OUTPATIENT)
Dept: RADIATION ONCOLOGY | Facility: HOSPITAL | Age: 54
End: 2019-07-15

## 2019-07-15 VITALS
DIASTOLIC BLOOD PRESSURE: 86 MMHG | OXYGEN SATURATION: 98 % | WEIGHT: 219.2 LBS | BODY MASS INDEX: 30.69 KG/M2 | SYSTOLIC BLOOD PRESSURE: 158 MMHG | HEART RATE: 59 BPM | TEMPERATURE: 97.1 F | RESPIRATION RATE: 20 BRPM

## 2019-07-15 DIAGNOSIS — C61 PROSTATE CANCER (HCC): ICD-10-CM

## 2019-07-15 LAB — PSA SERPL-MCNC: <0.014 NG/ML (ref 0–4)

## 2019-07-15 PROCEDURE — 84153 ASSAY OF PSA TOTAL: CPT | Performed by: RADIOLOGY

## 2019-07-15 PROCEDURE — G0103 PSA SCREENING: HCPCS | Performed by: RADIOLOGY

## 2019-07-15 PROCEDURE — G0463 HOSPITAL OUTPT CLINIC VISIT: HCPCS

## 2019-07-15 NOTE — PROGRESS NOTES
FOLLOW UP NOTE    PATIENT:                                                      Abilio Wan  MEDICAL RECORD #:                        [id number]  :                                                          1965  COMPLETION DATE:  11/13/15  DIAGNOSIS:     Prostate cancer (CMS/[facility])  Staging form: Prostate, AJCC V7  - Clinical stage from 2011: Stage III (rT3a, N0, M0, PSA: Less than 10, Ransom 7) - Signed by Royal Espinoza MD on 2016    BRIEF HISTORY:    Routine follow-up visit.   He is status post prostatectomy 2011.   He developed subsequent biochemical recurrence with PSA 0.21 ng/ml.    He was treated with salvage pelvic irradiation with short course hormonal therapy completing 2015.  He feels well and has no complaints.  PSA:  0.07 ng/ml 10--17  0.02 ng/ml  -18  0.04 ng/ml   18   0.06 ng/ml 1--19  <0.014 ng/ml 7-15-19 (today, [facility])    He reports no change in health, no recent change in medications or other treatment interventions.    MEDICATIONS: Medication reconciliation for the patient was reviewed and confirmed in the electronic medical record.    Review of Systems   All other systems reviewed and are negative.      KPS 90%    Physical Exam   Constitutional: He is oriented to person, place, and time. He appears well-developed and well-nourished.   HENT:   Head: Normocephalic and atraumatic.   Neck: Normal range of motion. Neck supple.   Cardiovascular: Normal rate, regular rhythm and normal heart sounds.   No murmur heard.  Pulmonary/Chest: Effort normal and breath sounds normal. He has no wheezes. He has no rales.   Abdominal: Soft. Bowel sounds are normal. He exhibits no distension. There is no hepatosplenomegaly. There is no tenderness.   Musculoskeletal: Normal range of motion. He exhibits no edema or tenderness.   Lymphadenopathy:     He has no cervical adenopathy.     He has no axillary adenopathy.        Right: No supraclavicular adenopathy  present.        Left: No supraclavicular adenopathy present.   Neurological: He is alert and oriented to person, place, and time. He has normal strength. No sensory deficit.   Skin: Skin is warm and dry.   Psychiatric: He has a normal mood and affect. His behavior is normal. Judgment and thought content normal.   Nursing note and vitals reviewed.      VITAL SIGNS:   Vitals:    07/15/19 1006   BP: 158/86   Pulse: 59   Resp: 20   Temp: 97.1 °F (36.2 °C)   TempSrc: Temporal   SpO2: 98%   Weight: 99.4 kg (219 lb 3.2 oz)   PainSc: 0-No pain       The following portions of the patient's history were reviewed and updated as appropriate: allergies, current medications, past family history, past medical history, past social history, past surgical history and problem list.         Abilio was seen today for prostate cancer.    Diagnoses and all orders for this visit:    Prostate cancer (CMS/[facility])  -     PSA Diagnostic  -     PSA Screen         IPSS Questionnaire (AUA-7):  Over the past month…      1) Incomplete Emptying  How often have you had a sensation of not emptying your bladder?  0 - Not at all   2) Frequency  How often have you had to urinate less than every two hours? 0 - Not at all   3) Intermittency  How often have you found you stopped and started again several times when you urinated?  0 - Not at all   4) Urgency  How often have you found it difficult to postpone urination?  0 - Not at all   5) Weak Stream  How often have you had a weak urinary stream?  0 - Not at all   6) Straining  How often have you had to push or strain to begin urination?  0 - Not at all   7) Nocturia  How many times did you typically get up at night to urinate?  0 - None   Total Score: 0           Quality of life due to urinary symptoms:  If you were to spend the rest of your life with your urinary condition the way it is now, how would you feel about that? 0-Delighted   Urine Leakage (Incontinence) 0-No Leakage       Sexual Health  Inventory  Current Status      1) How do you rate your confidence that you could achieve and keep an erection? 4-High   2) When you had erections with sexual stimulation, how often were your erections hard enough for penetration (entering your partner)? 4-Most times (much more than half the time)   3) During sexual intercourse, how often were you able to maintain your erection after you had penetrated (entered) into your partner? 3-Sometimes (about half the time)   4) During sexual intercourse, how difficult was it to maintain your erection to completion of intercourse? 5-Not difficult   5) When you attempted sexual intercourse, how often was it satisfactory to you? 5-Almost always or always   Total Score: 23           Bowel Health Inventory  Current Status: 0-No problems, no rectal bleeding, no discharge, less then 5 bowel movements a day        IMPRESSION:  Prostate cancer, 8 years after prostatectomy, more than 3 1/2 years after salvage pelvic irradiation/short course androgen ablation.  He had barely detectable PSA in the past couple years.  PSA is now undetectable, indicating complete remission status with no evidence of residual disease.    RECOMMENDATIONS: Continue surveillance with repeat PSA testing and follow-up in 6 months.    Return in about 6 months (around 1/15/2020) for Labs - See Treatment Plan, Office Visit.    Royal Espinoza MD    Errors in dictation may reflect use of voice recognition software and not all errors in transcription may have been detected prior to signing.

## 2019-07-16 LAB — PSA SERPL-MCNC: <0.014 NG/ML (ref 0–4)

## 2020-02-10 ENCOUNTER — LAB (OUTPATIENT)
Dept: LAB | Facility: HOSPITAL | Age: 55
End: 2020-02-10

## 2020-02-10 DIAGNOSIS — C61 PROSTATE CANCER (HCC): Primary | ICD-10-CM

## 2020-02-10 DIAGNOSIS — C61 PROSTATE CANCER (HCC): ICD-10-CM

## 2020-02-10 LAB — PSA SERPL-MCNC: <0.014 NG/ML (ref 0–4)

## 2020-02-10 PROCEDURE — 36415 COLL VENOUS BLD VENIPUNCTURE: CPT

## 2020-02-10 PROCEDURE — 84153 ASSAY OF PSA TOTAL: CPT

## 2020-02-12 ENCOUNTER — OFFICE VISIT (OUTPATIENT)
Dept: RADIATION ONCOLOGY | Facility: HOSPITAL | Age: 55
End: 2020-02-12

## 2020-02-12 ENCOUNTER — HOSPITAL ENCOUNTER (OUTPATIENT)
Dept: RADIATION ONCOLOGY | Facility: HOSPITAL | Age: 55
Setting detail: RADIATION/ONCOLOGY SERIES
Discharge: HOME | End: 2020-02-12

## 2020-02-12 VITALS
BODY MASS INDEX: 31.08 KG/M2 | HEIGHT: 71 IN | OXYGEN SATURATION: 97 % | WEIGHT: 222 LBS | SYSTOLIC BLOOD PRESSURE: 137 MMHG | HEART RATE: 65 BPM | TEMPERATURE: 97.2 F | RESPIRATION RATE: 16 BRPM | DIASTOLIC BLOOD PRESSURE: 89 MMHG

## 2020-02-12 DIAGNOSIS — C61 PROSTATE CANCER (HCC): Primary | ICD-10-CM

## 2020-02-12 PROCEDURE — G0463 HOSPITAL OUTPT CLINIC VISIT: HCPCS

## 2020-02-12 RX ORDER — TADALAFIL 10 MG/1
10-20 TABLET ORAL DAILY PRN
Qty: 60 TABLET | Refills: 5 | Status: SHIPPED | OUTPATIENT
Start: 2020-02-12 | End: 2021-02-24

## 2020-02-12 NOTE — PROGRESS NOTES
FOLLOW UP NOTE    PATIENT:                                                      Abilio Wan  MEDICAL RECORD #:                        [id number]  :                                                          1965  COMPLETION DATE:   2015  DIAGNOSIS:     Prostate cancer (CMS/[facility])  - Stage III (rT3a, N0, M0, PSA: Less than 10, Harris 7)      BRIEF HISTORY:    Routine follow-up visit.   He is status post prostatectomy 2011.   He developed subsequent biochemical recurrence with PSA 0.21 ng/ml.    He was treated with salvage pelvic irradiation with short course hormonal therapy completing 2015.  He feels well and has no complaints.  No voiding difficulties.  PSA:  0.07 ng/ml 10-24-  0.02 ng/ml  18  0.04 ng/ml   18   0.06 ng/ml --19  <0.014 ng/ml -15-19  <0.014 ng/ml 2-10-20    He reports no change in health, no recent change in medications or other treatment interventions.  He successfully uses sildenafil 60 mg as needed for ED, but experiences medication associated symptoms of headaches.      MEDICATIONS: Medication reconciliation for the patient was reviewed and confirmed in the electronic medical record.    Review of Systems   Constitutional: Negative.    HENT:  Negative.    Eyes: Negative.    Respiratory: Negative.    Cardiovascular: Negative.    Gastrointestinal: Negative.    Endocrine: Negative.    Genitourinary: Negative.     Musculoskeletal: Negative.    Skin: Negative.    Neurological: Negative.    Hematological: Negative.    Psychiatric/Behavioral: Negative.        KPS 90%    Physical Exam   Constitutional: He is oriented to person, place, and time. He appears well-developed and well-nourished.   HENT:   Head: Normocephalic and atraumatic.   Neck: Normal range of motion. Neck supple.   Cardiovascular: Normal rate, regular rhythm and normal heart sounds.   No murmur heard.  Pulmonary/Chest: Effort normal and breath sounds normal. He has no wheezes. He has no rales.  "  Abdominal: Soft. Bowel sounds are normal. He exhibits no distension. There is no hepatosplenomegaly. There is no tenderness.   Musculoskeletal: Normal range of motion. He exhibits no edema or tenderness.   Lymphadenopathy:     He has no cervical adenopathy.     He has no axillary adenopathy.        Right: No supraclavicular adenopathy present.        Left: No supraclavicular adenopathy present.   Neurological: He is alert and oriented to person, place, and time. He has normal strength. No sensory deficit.   Skin: Skin is warm and dry.   Psychiatric: He has a normal mood and affect. His behavior is normal. Judgment and thought content normal.   Nursing note and vitals reviewed.      VITAL SIGNS:   Vitals:    02/12/20 1506   BP: 137/89   Pulse: 65   Resp: 16   Temp: 97.2 °F (36.2 °C)   TempSrc: Temporal   SpO2: 97%  Comment: RA   Weight: 101 kg (222 lb)   Height: 180.3 cm (71\")   PainSc: 0-No pain       The following portions of the patient's history were reviewed and updated as appropriate: allergies, current medications, past family history, past medical history, past social history, past surgical history and problem list.         Abilio was seen today for prostate cancer.    Diagnoses and all orders for this visit:    Prostate cancer (CMS/[facility])  -     PSA Diagnostic; Future    Other orders  -     tadalafil (CIALIS) 10 MG tablet; Take 1-2 tablets by mouth Daily As Needed for Erectile Dysfunction.         IMPRESSION:  Prostate cancer, 8 1/2 years after prostatectomy, more than 4 years after salvage pelvic irradiation/short course androgen ablation.  He now has 2 undetectable PSA readings, indicating complete remission status with no evidence of residual disease.    RECOMMENDATIONS: Continue surveillance with biannual PSA testing and follow-up until he reaches 5 years post treatment and then annual follow-up as long as he remains in remission.  He will trial tadalafil as needed to see if this is better tolerated " than sildenafil.    Return in about 6 months (around 8/12/2020) for Labs - See Treatment Plan, Office Visit.    Royal Espinoza MD    Errors in dictation may reflect use of voice recognition software and not all errors in transcription may have been detected prior to signing.

## 2020-05-27 ENCOUNTER — TELEPHONE (OUTPATIENT)
Dept: FAMILY MEDICINE CLINIC | Facility: CLINIC | Age: 55
End: 2020-05-27

## 2020-05-27 DIAGNOSIS — Z12.11 COLON CANCER SCREENING: Primary | ICD-10-CM

## 2020-05-27 NOTE — TELEPHONE ENCOUNTER
PT. SEE'S DR. MANN  PT. IS NEEDING A REFERRAL FOR A COLONOSCOPY.    PT. CAN BE REACHED @ [phone].

## 2020-06-10 RX ORDER — SODIUM, POTASSIUM,MAG SULFATES 17.5-3.13G
SOLUTION, RECONSTITUTED, ORAL ORAL
Qty: 2 BOTTLE | Refills: 0 | Status: SHIPPED | OUTPATIENT
Start: 2020-06-10 | End: 2020-08-19 | Stop reason: SDUPTHER

## 2020-06-21 ENCOUNTER — APPOINTMENT (OUTPATIENT)
Dept: PREADMISSION TESTING | Facility: HOSPITAL | Age: 55
End: 2020-06-21

## 2020-08-10 ENCOUNTER — LAB (OUTPATIENT)
Dept: LAB | Facility: HOSPITAL | Age: 55
End: 2020-08-10

## 2020-08-10 DIAGNOSIS — C61 PROSTATE CANCER (HCC): ICD-10-CM

## 2020-08-10 PROCEDURE — 36415 COLL VENOUS BLD VENIPUNCTURE: CPT

## 2020-08-10 PROCEDURE — 84153 ASSAY OF PSA TOTAL: CPT

## 2020-08-11 LAB — PSA SERPL-MCNC: <0.014 NG/ML (ref 0–4)

## 2020-08-14 ENCOUNTER — TELEPHONE (OUTPATIENT)
Dept: RADIATION ONCOLOGY | Facility: HOSPITAL | Age: 55
End: 2020-08-14

## 2020-08-14 ENCOUNTER — HOSPITAL ENCOUNTER (OUTPATIENT)
Dept: RADIATION ONCOLOGY | Facility: HOSPITAL | Age: 55
Setting detail: RADIATION/ONCOLOGY SERIES
Discharge: HOME | End: 2020-08-14

## 2020-08-14 NOTE — TELEPHONE ENCOUNTER
Pt called and asked since PSA is undetectable if he could move his appt out 6 months.  I left message for new appt 2/24/21 @ [id number].  Instructed to call for any questions.

## 2020-08-19 RX ORDER — SODIUM, POTASSIUM,MAG SULFATES 17.5-3.13G
SOLUTION, RECONSTITUTED, ORAL ORAL
Qty: 2 BOTTLE | Refills: 0 | Status: SHIPPED | OUTPATIENT
Start: 2020-08-19 | End: 2021-02-24

## 2020-08-25 ENCOUNTER — APPOINTMENT (OUTPATIENT)
Dept: PREADMISSION TESTING | Facility: HOSPITAL | Age: 55
End: 2020-08-25

## 2020-08-25 LAB
REF LAB TEST METHOD: NORMAL
SARS-COV-2 RNA RESP QL NAA+PROBE: NOT DETECTED

## 2020-08-25 PROCEDURE — U0004 COV-19 TEST NON-CDC HGH THRU: HCPCS

## 2020-08-25 PROCEDURE — C9803 HOPD COVID-19 SPEC COLLECT: HCPCS

## 2020-08-25 PROCEDURE — U0002 COVID-19 LAB TEST NON-CDC: HCPCS

## 2020-08-28 ENCOUNTER — OUTSIDE FACILITY SERVICE (OUTPATIENT)
Dept: GASTROENTEROLOGY | Facility: CLINIC | Age: 55
End: 2020-08-28

## 2020-08-28 PROCEDURE — 45385 COLONOSCOPY W/LESION REMOVAL: CPT | Performed by: INTERNAL MEDICINE

## 2020-08-28 PROCEDURE — 88305 TISSUE EXAM BY PATHOLOGIST: CPT | Performed by: INTERNAL MEDICINE

## 2020-08-29 ENCOUNTER — LAB REQUISITION (OUTPATIENT)
Dept: LAB | Facility: HOSPITAL | Age: 55
End: 2020-08-29

## 2020-08-29 DIAGNOSIS — Z12.11 ENCOUNTER FOR SCREENING FOR MALIGNANT NEOPLASM OF COLON: ICD-10-CM

## 2020-09-01 LAB
CYTO UR: NORMAL
LAB AP CASE REPORT: NORMAL
LAB AP CLINICAL INFORMATION: NORMAL
PATH REPORT.FINAL DX SPEC: NORMAL
PATH REPORT.GROSS SPEC: NORMAL

## 2021-01-27 DIAGNOSIS — C61 PROSTATE CANCER (HCC): Primary | ICD-10-CM

## 2021-02-19 ENCOUNTER — LAB (OUTPATIENT)
Dept: LAB | Facility: HOSPITAL | Age: 56
End: 2021-02-19

## 2021-02-19 DIAGNOSIS — C61 PROSTATE CANCER (HCC): ICD-10-CM

## 2021-02-19 LAB — PSA SERPL-MCNC: <0.014 NG/ML (ref 0–4)

## 2021-02-19 PROCEDURE — 36415 COLL VENOUS BLD VENIPUNCTURE: CPT

## 2021-02-19 PROCEDURE — 84153 ASSAY OF PSA TOTAL: CPT

## 2021-02-24 ENCOUNTER — HOSPITAL ENCOUNTER (OUTPATIENT)
Dept: RADIATION ONCOLOGY | Facility: HOSPITAL | Age: 56
Setting detail: RADIATION/ONCOLOGY SERIES
Discharge: HOME | End: 2021-02-24

## 2021-02-24 ENCOUNTER — OFFICE VISIT (OUTPATIENT)
Dept: RADIATION ONCOLOGY | Facility: HOSPITAL | Age: 56
End: 2021-02-24

## 2021-02-24 VITALS
DIASTOLIC BLOOD PRESSURE: 93 MMHG | TEMPERATURE: 97.7 F | OXYGEN SATURATION: 96 % | HEART RATE: 93 BPM | RESPIRATION RATE: 16 BRPM | BODY MASS INDEX: 32.48 KG/M2 | SYSTOLIC BLOOD PRESSURE: 147 MMHG | HEIGHT: 71 IN | WEIGHT: 232 LBS

## 2021-02-24 DIAGNOSIS — C61 PROSTATE CANCER (HCC): Primary | ICD-10-CM

## 2021-02-24 DIAGNOSIS — N52.31 ERECTILE DYSFUNCTION AFTER RADICAL PROSTATECTOMY: ICD-10-CM

## 2021-02-24 PROCEDURE — G0463 HOSPITAL OUTPT CLINIC VISIT: HCPCS

## 2021-02-24 RX ORDER — SILDENAFIL CITRATE 20 MG/1
20 TABLET ORAL AS NEEDED
Qty: 90 TABLET | Refills: 6 | Status: SHIPPED | OUTPATIENT
Start: 2021-02-24 | End: 2022-02-28 | Stop reason: SDUPTHER

## 2021-02-24 NOTE — PROGRESS NOTES
FOLLOW UP NOTE    PATIENT:                                                      Abilio Wan  MEDICAL RECORD #:                        [id number]  :                                                          1965  COMPLETION DATE:   2015  DIAGNOSIS:     Prostate cancer (CMS/[facility])  - Stage III (rT3a, N0, M0, PSA: Less than 10, Harris 7)      BRIEF HISTORY:    Routine follow-up visit.   He is status post prostatectomy 2011.   He developed subsequent biochemical recurrence with PSA 0.21 ng/ml.    He was treated with salvage pelvic irradiation with short course hormonal therapy completing 2015.  He feels well and has no complaints.    No voiding difficulties.  Denies hematuria, dysuria, or incontinence.  Bowels are regular.  He reports good effect with prn sildenafil 40-60 mg for chronic ED.  He feels well.  No new aches or pains.     PSA:      0.07 ng/ml  10-      0.02 ng/ml   2018      0.04 ng/ml   2018       0.06 ng/ml  2019  <0.014 ng/ml 7-  <0.014 ng/ml  2-  <0.014 ng/ml 8-  <0.014 ng/ml 2021         IPSS Questionnaire (AUA-7):  Over the past month…    1)  Incomplete Emptying  How often have you had a sensation of not emptying your bladder?  0 - Not at all   2)  Frequency  How often have you had to urinate less than every two hours? 0 - Not at all   3)  Intermittency  How often have you found you stopped and started again several times when you urinated?  0 - Not at all   4) Urgency  How often have you found it difficult to postpone urination?  0 - Not at all   5) Weak Stream  How often have you had a weak urinary stream?  0 - Not at all   6) Straining  How often have you had to push or strain to begin urination?  0 - Not at all   7) Nocturia  How many times did you typically get up at night to urinate?  0 - None   Total Score:  0       Quality of life due to urinary symptoms:  If you were to spend the rest of your life with your urinary  condition the way it is now, how would you feel about that? 0-Delighted   Urine Leakage (Incontinence) 0-No Leakage     Sexual Health Inventory  Current Status    1)  How do you rate your confidence that you could achieve and keep an erection? 4-High   2) When you had erections with sexual stimulation, how often were your erections hard enough for penetration (entering your partner)? 5-Almost always or always   3)  During sexual intercourse, how often were you able to maintain your erection after you had penetrated (entered) into your partner? 5-Almost always or always   4) During sexual intercourse, how difficult was it to maintain your erection to completion of intercourse? 5-Not difficult   5) When you attempted sexual intercourse, how often was it satisfactory to you? 5-Almost always or always   Total Score: 24       Bowel Health Inventory  Current Status: 0-No problems, no rectal bleeding, no discharge, less then 5 bowel movements a day         MEDICATIONS: Medication reconciliation for the patient was reviewed and confirmed in the electronic medical record.    Review of Systems   All other systems reviewed and are negative.      KPS 90%    Physical Exam  Vitals signs and nursing note reviewed.   Constitutional:       General: He is not in acute distress.     Appearance: Normal appearance. He is well-developed.   HENT:      Head: Normocephalic and atraumatic.   Eyes:      Conjunctiva/sclera: Conjunctivae normal.      Pupils: Pupils are equal, round, and reactive to light.   Neck:      Musculoskeletal: Normal range of motion and neck supple.   Cardiovascular:      Rate and Rhythm: Normal rate and regular rhythm.      Heart sounds: No murmur. No friction rub.   Pulmonary:      Effort: Pulmonary effort is normal.      Breath sounds: Normal breath sounds. No wheezing.   Abdominal:      General: Bowel sounds are normal. There is no distension.      Palpations: Abdomen is soft. There is no mass.      Tenderness:  "There is no abdominal tenderness.   Genitourinary:     Comments: Deferred  Musculoskeletal: Normal range of motion.         General: No swelling.   Lymphadenopathy:      Cervical: No cervical adenopathy.      Upper Body:      Right upper body: No supraclavicular adenopathy.      Left upper body: No supraclavicular adenopathy.   Skin:     General: Skin is warm and dry.   Neurological:      Mental Status: He is alert and oriented to person, place, and time.   Psychiatric:         Behavior: Behavior normal.         Thought Content: Thought content normal.         Judgment: Judgment normal.         VITAL SIGNS:   Vitals:    02/24/21 0941   BP: 147/93   Pulse: 93   Resp: 16   Temp: 97.7 °F (36.5 °C)   TempSrc: Infrared   SpO2: 96%  Comment: RA   Weight: 105 kg (232 lb)   Height: 180.3 cm (71\")   PainSc: 0-No pain         The following portions of the patient's history were reviewed and updated as appropriate: allergies, current medications, past family history, past medical history, past social history, past surgical history and problem list.         Diagnoses and all orders for this visit:    1. Prostate cancer (CMS/HCC) (Primary)    2. Erectile dysfunction after radical prostatectomy  -     sildenafil (REVATIO) 20 MG tablet; Take 1 tablet by mouth As Needed (as needed for ED). 1-5 tablets daily as needed  Dispense: 90 tablet; Refill: 6         IMPRESSION:  Prostate cancer, 9 years after prostatectomy, more than 5 years after salvage pelvic irradiation/short course androgen ablation.  PSA has remained undetectable since July 2019, indicating complete remission status with no evidence of residual disease.    RECOMMENDATIONS:  Continue surveillance with biannual PSA testing through his PCP and Dr. Walden, with annual follow-up as long as he remains in remission.    Return in about 1 year (around 2/24/2022) for Office Visit.    Pipe Morillo, LILIANA      "

## 2021-07-29 ENCOUNTER — OFFICE VISIT (OUTPATIENT)
Dept: FAMILY MEDICINE CLINIC | Facility: CLINIC | Age: 56
End: 2021-07-29

## 2021-07-29 ENCOUNTER — LAB (OUTPATIENT)
Dept: LAB | Facility: HOSPITAL | Age: 56
End: 2021-07-29

## 2021-07-29 VITALS
BODY MASS INDEX: 31.36 KG/M2 | RESPIRATION RATE: 14 BRPM | HEIGHT: 71 IN | SYSTOLIC BLOOD PRESSURE: 128 MMHG | OXYGEN SATURATION: 99 % | WEIGHT: 224 LBS | TEMPERATURE: 96.9 F | DIASTOLIC BLOOD PRESSURE: 88 MMHG | HEART RATE: 67 BPM

## 2021-07-29 DIAGNOSIS — Z00.00 ANNUAL PHYSICAL EXAM: ICD-10-CM

## 2021-07-29 DIAGNOSIS — Z00.00 ANNUAL PHYSICAL EXAM: Primary | ICD-10-CM

## 2021-07-29 LAB
ALBUMIN SERPL-MCNC: 4.2 G/DL (ref 3.5–5.2)
ALBUMIN/GLOB SERPL: 1.5 G/DL
ALP SERPL-CCNC: 80 U/L (ref 39–117)
ALT SERPL W P-5'-P-CCNC: 24 U/L (ref 1–41)
ANION GAP SERPL CALCULATED.3IONS-SCNC: 9.7 MMOL/L (ref 5–15)
AST SERPL-CCNC: 20 U/L (ref 1–40)
BILIRUB BLD-MCNC: NEGATIVE MG/DL
BILIRUB SERPL-MCNC: 0.3 MG/DL (ref 0–1.2)
BUN SERPL-MCNC: 11 MG/DL (ref 6–20)
BUN/CREAT SERPL: 14.5 (ref 7–25)
CALCIUM SPEC-SCNC: 9.2 MG/DL (ref 8.6–10.5)
CHLORIDE SERPL-SCNC: 104 MMOL/L (ref 98–107)
CHOLEST SERPL-MCNC: 188 MG/DL (ref 0–200)
CLARITY, POC: CLEAR
CO2 SERPL-SCNC: 28.3 MMOL/L (ref 22–29)
COLOR UR: YELLOW
CREAT SERPL-MCNC: 0.76 MG/DL (ref 0.76–1.27)
DEPRECATED RDW RBC AUTO: 41.8 FL (ref 37–54)
ERYTHROCYTE [DISTWIDTH] IN BLOOD BY AUTOMATED COUNT: 13.7 % (ref 12.3–15.4)
GFR SERPL CREATININE-BSD FRML MDRD: 106 ML/MIN/1.73
GLOBULIN UR ELPH-MCNC: 2.8 GM/DL
GLUCOSE SERPL-MCNC: 68 MG/DL (ref 65–99)
GLUCOSE UR STRIP-MCNC: NEGATIVE MG/DL
HCT VFR BLD AUTO: 46.3 % (ref 37.5–51)
HDLC SERPL-MCNC: 46 MG/DL (ref 40–60)
HGB BLD-MCNC: 15.9 G/DL (ref 13–17.7)
KETONES UR QL: NEGATIVE
LDLC SERPL CALC-MCNC: 119 MG/DL (ref 0–100)
LDLC/HDLC SERPL: 2.52 {RATIO}
LEUKOCYTE EST, POC: NEGATIVE
MCH RBC QN AUTO: 29.3 PG (ref 26.6–33)
MCHC RBC AUTO-ENTMCNC: 34.3 G/DL (ref 31.5–35.7)
MCV RBC AUTO: 85.4 FL (ref 79–97)
NITRITE UR-MCNC: NEGATIVE MG/ML
PH UR: 6.5 [PH] (ref 5–8)
PLATELET # BLD AUTO: 266 10*3/MM3 (ref 140–450)
PMV BLD AUTO: 10 FL (ref 6–12)
POTASSIUM SERPL-SCNC: 4.2 MMOL/L (ref 3.5–5.2)
PROT SERPL-MCNC: 7 G/DL (ref 6–8.5)
PROT UR STRIP-MCNC: NEGATIVE MG/DL
RBC # BLD AUTO: 5.42 10*6/MM3 (ref 4.14–5.8)
RBC # UR STRIP: NEGATIVE /UL
SODIUM SERPL-SCNC: 142 MMOL/L (ref 136–145)
SP GR UR: 1.01 (ref 1–1.03)
T4 FREE SERPL-MCNC: 1.17 NG/DL (ref 0.93–1.7)
TRIGL SERPL-MCNC: 130 MG/DL (ref 0–150)
TSH SERPL DL<=0.05 MIU/L-ACNC: 2.1 UIU/ML (ref 0.27–4.2)
UROBILINOGEN UR QL: NORMAL
VLDLC SERPL-MCNC: 23 MG/DL (ref 5–40)
WBC # BLD AUTO: 4.58 10*3/MM3 (ref 3.4–10.8)

## 2021-07-29 PROCEDURE — 84439 ASSAY OF FREE THYROXINE: CPT

## 2021-07-29 PROCEDURE — 81003 URINALYSIS AUTO W/O SCOPE: CPT | Performed by: FAMILY MEDICINE

## 2021-07-29 PROCEDURE — 80061 LIPID PANEL: CPT

## 2021-07-29 PROCEDURE — 36415 COLL VENOUS BLD VENIPUNCTURE: CPT

## 2021-07-29 PROCEDURE — 80053 COMPREHEN METABOLIC PANEL: CPT

## 2021-07-29 PROCEDURE — 99396 PREV VISIT EST AGE 40-64: CPT | Performed by: FAMILY MEDICINE

## 2021-07-29 PROCEDURE — 84443 ASSAY THYROID STIM HORMONE: CPT

## 2021-07-29 PROCEDURE — 85027 COMPLETE CBC AUTOMATED: CPT

## 2021-07-29 NOTE — PROGRESS NOTES
Subjective   Abilio Wan is a 56 y.o. male    Chief Complaint    Annual physical    History of Present Illness  The patient reports he is doing well. He denies having pain. The patient voiced no new or acute complaints today.    The following portions of the patient's history were reviewed and updated as appropriate: allergies, current medications, past social history and problem list    Review of Systems   Constitutional: Negative.    HENT: Negative.    Eyes: Negative.    Respiratory: Negative.    Cardiovascular: Negative.    Gastrointestinal: Negative.    Endocrine: Negative.    Genitourinary: Negative.    Musculoskeletal: Negative.    Skin: Negative.    Allergic/Immunologic: Negative.    Neurological: Negative.    Hematological: Negative.    Psychiatric/Behavioral: Negative.    All other systems reviewed and are negative.      Objective     Vitals:    07/29/21 0904   BP: 128/88   Pulse: 67   Resp: 14   Temp: 96.9 °F (36.1 °C)   SpO2: 99%       Physical Exam  Constitutional:       Appearance: He is well-developed.   HENT:      Head: Normocephalic and atraumatic.      Right Ear: External ear normal.      Left Ear: External ear normal.      Nose: Nose normal.   Eyes:      Conjunctiva/sclera: Conjunctivae normal.      Pupils: Pupils are equal, round, and reactive to light.   Neck:      Thyroid: No thyromegaly.   Cardiovascular:      Rate and Rhythm: Normal rate and regular rhythm.      Heart sounds: Normal heart sounds. No murmur heard.     Pulmonary:      Effort: Pulmonary effort is normal.      Breath sounds: Normal breath sounds.   Abdominal:      General: Bowel sounds are normal.      Palpations: Abdomen is soft. There is no mass.      Tenderness: There is no abdominal tenderness.   Genitourinary:     Penis: Normal.       Prostate: Normal.      Rectum: Normal.   Musculoskeletal:         General: Normal range of motion.      Cervical back: Normal range of motion and neck supple.   Skin:     General: Skin is  warm and dry.   Neurological:      Mental Status: He is alert and oriented to person, place, and time.      Cranial Nerves: No cranial nerve deficit.      Deep Tendon Reflexes: Reflexes are normal and symmetric.         Assessment/Plan   Problems Addressed this Visit     None      Visit Diagnoses     Annual physical exam    -  Primary    Relevant Orders    POC Urinalysis Dipstick, Automated (Completed)    CBC (No Diff)    Comprehensive Metabolic Panel    Lipid Panel    TSH    T4, Free      Diagnoses       Codes Comments    Annual physical exam    -  Primary ICD-10-CM: Z00.00  ICD-9-CM: V70.0         Preventive medicine discussed, diet, exercise, healthy living discussed at length.  Discussed ways to improve overall health, discussed proper sleep, proper weight etc.        Please note that portions of this document were completed with a voice recognition program. Efforts were made to edit the dictations, but occasionally words are mis-transcribed       Scribed for JAIMIE Baker MD by Shereen Martinez.  07/29/21   10:12 EDT    I have personally performed the services described in this document as scribed by the above individual, and it is both accurate and complete.  JAIMIE Baker MD  7/29/2021  16:49 EDT

## 2021-08-03 ENCOUNTER — TELEPHONE (OUTPATIENT)
Dept: FAMILY MEDICINE CLINIC | Facility: CLINIC | Age: 56
End: 2021-08-03

## 2021-08-03 NOTE — TELEPHONE ENCOUNTER
Only persons who have had previous anaphylactic reactions to immunizations can get exempted from the Covid vaccine.

## 2021-08-03 NOTE — TELEPHONE ENCOUNTER
Pt notified of normal labs and that letter was on the way. He requested to talk to you personally to discuss the Covid vaccine, with his history of cancer he's cautious of what he puts in his body and wants a medical exemption from the vaccine. I told him that I would send a message but that you are very busy and typically does not do personal pt phone calls.

## 2021-08-03 NOTE — TELEPHONE ENCOUNTER
Caller: Abilio Wan    Relationship: Self    Best call back number: [phone]     What was the call regarding:PATIENT CALLED TO DISCUSS HIS LAB RESULTS.    Do you require a callback: YES

## 2022-02-22 ENCOUNTER — LAB (OUTPATIENT)
Dept: LAB | Facility: HOSPITAL | Age: 57
End: 2022-02-22

## 2022-02-22 DIAGNOSIS — C61 PROSTATE CANCER: ICD-10-CM

## 2022-02-22 DIAGNOSIS — C61 PROSTATE CANCER: Primary | ICD-10-CM

## 2022-02-22 PROCEDURE — 36415 COLL VENOUS BLD VENIPUNCTURE: CPT

## 2022-02-22 PROCEDURE — 84153 ASSAY OF PSA TOTAL: CPT

## 2022-02-23 LAB — PSA SERPL-MCNC: <0.014 NG/ML (ref 0–4)

## 2022-02-24 ENCOUNTER — HOSPITAL ENCOUNTER (OUTPATIENT)
Dept: RADIATION ONCOLOGY | Facility: HOSPITAL | Age: 57
Setting detail: RADIATION/ONCOLOGY SERIES
Discharge: HOME | End: 2022-02-24

## 2022-02-28 ENCOUNTER — OFFICE VISIT (OUTPATIENT)
Dept: RADIATION ONCOLOGY | Facility: HOSPITAL | Age: 57
End: 2022-02-28

## 2022-02-28 VITALS
WEIGHT: 232.9 LBS | DIASTOLIC BLOOD PRESSURE: 90 MMHG | RESPIRATION RATE: 16 BRPM | OXYGEN SATURATION: 98 % | SYSTOLIC BLOOD PRESSURE: 160 MMHG | HEIGHT: 70 IN | TEMPERATURE: 98.3 F | BODY MASS INDEX: 33.34 KG/M2 | HEART RATE: 99 BPM

## 2022-02-28 DIAGNOSIS — N52.31 ERECTILE DYSFUNCTION AFTER RADICAL PROSTATECTOMY: ICD-10-CM

## 2022-02-28 DIAGNOSIS — C61 PROSTATE CANCER: Primary | ICD-10-CM

## 2022-02-28 PROCEDURE — G0463 HOSPITAL OUTPT CLINIC VISIT: HCPCS

## 2022-02-28 RX ORDER — SILDENAFIL CITRATE 20 MG/1
20 TABLET ORAL AS NEEDED
Qty: 90 TABLET | Refills: 6 | Status: SHIPPED | OUTPATIENT
Start: 2022-02-28

## 2022-02-28 NOTE — PROGRESS NOTES
FOLLOW UP NOTE    PATIENT:                                                      Abilio Wan  MEDICAL RECORD #:                        [id number]  :                                                          1965  COMPLETION DATE:   2015  DIAGNOSIS:     Prostate cancer (HCC)  - Stage III (rT3a, N0, M0, PSA: Less than 10, [place] 7)      BRIEF HISTORY:    Routine follow-up visit.   He is status post prostatectomy 2011.   He developed subsequent biochemical recurrence with PSA 0.21 ng/ml.    He was treated with salvage pelvic irradiation with short course hormonal therapy completing 2015.  He feels well and has no complaints.    No voiding difficulties.  Denies hematuria, dysuria, or incontinence.  Bowels are regular.  He reports good effect with prn sildenafil 40-60 mg for chronic ED.  He feels well.  No new aches or pains.      PSA:      0.07 ng/ml   10-      0.02 ng/ml   2018      0.04 ng/ml   2018       0.06 ng/ml   2019  <0.014 ng/ml   7-  <0.014 ng/ml   2-  <0.014 ng/ml   8-  <0.014 ng/ml   2021  <0.014 ng/ml   2022    MEDICATIONS: Medication reconciliation for the patient was reviewed and confirmed in the electronic medical record.    Review of Systems   All other systems reviewed and are negative.        IPSS Questionnaire (AUA-7):  Over the past month…     1)  Incomplete Emptying  How often have you had a sensation of not emptying your bladder?  0 - Not at all   2)  Frequency  How often have you had to urinate less than every two hours? 0 - Not at all   3)  Intermittency  How often have you found you stopped and started again several times when you urinated?  0 - Not at all   4) Urgency  How often have you found it difficult to postpone urination?  0 - Not at all   5) Weak Stream  How often have you had a weak urinary stream?  0 - Not at all   6) Straining  How often have you had to push or strain to begin urination?  0 - Not  at all   7) Nocturia  How many times did you typically get up at night to urinate?  0 - None   Total Score:  0         Quality of life due to urinary symptoms:  If you were to spend the rest of your life with your urinary condition the way it is now, how would you feel about that? 0-Delighted   Urine Leakage (Incontinence) 0-No Leakage      Sexual Health Inventory  Current Status     1)  How do you rate your confidence that you could achieve and keep an erection? 4-High   2) When you had erections with sexual stimulation, how often were your erections hard enough for penetration (entering your partner)? 5-Almost always or always   3)  During sexual intercourse, how often were you able to maintain your erection after you had penetrated (entered) into your partner? 5-Almost always or always   4) During sexual intercourse, how difficult was it to maintain your erection to completion of intercourse? 5-Not difficult   5) When you attempted sexual intercourse, how often was it satisfactory to you? 5-Almost always or always   Total Score: 24         Bowel Health Inventory  Current Status: 0-No problems, no rectal bleeding, no discharge, less then 5 bowel movements a day                Karnofsky score: 90     Physical Exam  Vitals and nursing note reviewed.   Constitutional:       Appearance: He is well-developed.   HENT:      Head: Normocephalic and atraumatic.   Cardiovascular:      Rate and Rhythm: Normal rate and regular rhythm.      Heart sounds: Normal heart sounds. No murmur heard.      Pulmonary:      Effort: Pulmonary effort is normal.      Breath sounds: Normal breath sounds. No wheezing or rales.   Chest:   Breasts:      Right: No supraclavicular adenopathy.      Left: No supraclavicular adenopathy.       Abdominal:      General: Bowel sounds are normal. There is no distension.      Palpations: Abdomen is soft.      Tenderness: There is no abdominal tenderness.   Musculoskeletal:         General: No tenderness.  "Normal range of motion.      Cervical back: Normal range of motion and neck supple.   Lymphadenopathy:      Cervical: No cervical adenopathy.      Upper Body:      Right upper body: No supraclavicular adenopathy.      Left upper body: No supraclavicular adenopathy.   Skin:     General: Skin is warm and dry.   Neurological:      Mental Status: He is alert and oriented to person, place, and time.      Sensory: No sensory deficit.   Psychiatric:         Behavior: Behavior normal.         Thought Content: Thought content normal.         Judgment: Judgment normal.         VITAL SIGNS:   Vitals:    02/28/22 1509   BP: 160/90   Pulse: 99   Resp: 16   Temp: 98.3 °F (36.8 °C)   SpO2: 98%  Comment: RA   Weight: 106 kg (232 lb 14.4 oz)   Height: 177.8 cm (70\")   PainSc: 0-No pain             Karnofsky score: 90         The following portions of the patient's history were reviewed and updated as appropriate: allergies, current medications, past family history, past medical history, past social history, past surgical history and problem list.         Diagnoses and all orders for this visit:    1. Prostate cancer (HCC) (Primary)    2. Erectile dysfunction after radical prostatectomy  -     sildenafil (REVATIO) 20 MG tablet; Take 1 tablet by mouth As Needed (as needed for ED). 1-5 tablets daily as needed  Dispense: 90 tablet; Refill: 6         IMPRESSION:  Prostate cancer, 10 years after prostatectomy, more than 6 years after salvage pelvic irradiation/short course androgen ablation.  PSA has remained undetectable since July 2019, indicating complete remission status with no evidence of residual disease.    RECOMMENDATIONS: Annual PSA and follow-up.  I will be happy to continue to follow him, since he no longer sees his urologist, Dr. Walden.    Return in about 1 year (around 2/28/2023) for Labs - See Treatment Plan, Office Visit.    Royal Espinoza MD    Approximately 15 minutes was spent with patient and reviewing records.  "

## 2022-08-29 ENCOUNTER — TREATMENT (OUTPATIENT)
Dept: PHYSICAL THERAPY | Facility: CLINIC | Age: 57
End: 2022-08-29

## 2022-08-29 DIAGNOSIS — G89.29 CHRONIC LEFT SHOULDER PAIN: Primary | ICD-10-CM

## 2022-08-29 DIAGNOSIS — M25.512 CHRONIC LEFT SHOULDER PAIN: Primary | ICD-10-CM

## 2022-08-29 PROCEDURE — 97161 PT EVAL LOW COMPLEX 20 MIN: CPT | Performed by: PHYSICAL THERAPIST

## 2022-08-29 PROCEDURE — 97140 MANUAL THERAPY 1/> REGIONS: CPT | Performed by: PHYSICAL THERAPIST

## 2022-08-29 PROCEDURE — 97110 THERAPEUTIC EXERCISES: CPT | Performed by: PHYSICAL THERAPIST

## 2022-08-29 NOTE — PROGRESS NOTES
Physical Therapy Initial Evaluation and Plan of Care    Patient: Abilio Wan   : 1965  Diagnosis/ICD-10 Code:  Chronic left shoulder pain [M25.512, G89.29]  Referring practitioner: Ad Capps MD  Date of Initial Visit: 2022  Today's Date: 2022  Patient seen for 1 session         Visit Diagnoses:    ICD-10-CM ICD-9-CM   1. Chronic left shoulder pain  M25.512 719.41    G89.29 338.29           Subjective Evaluation    History of Present Illness  Mechanism of injury: Pt states that about 4 months ago he was swimming a breast stroke and the left shoulder started hurting. His pain level decreased over time, but did not go away. He eventually went to see Dr. Capps and radiograph was negative and he was referred for PT.     He denies any pain at rest and he denies any sleep distrubance, but he has some pain in the shoulder when reaching overhead and when reaching behind his back. The pain doesn't build and goes away with repetition.       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  Quality of life: good    Pain  Current pain ratin  At best pain ratin  At worst pain ratin  Location: deep in the joint  Quality: dull ache  Relieving factors: change in position  Aggravating factors: overhead activity (reaching behind his back)  Progression: improved    Hand dominance: right    Diagnostic Tests  X-ray: normal    Treatments  No previous or current treatments  Patient Goals  Patient goals for therapy: decreased pain, increased motion, increased strength, independence with ADLs/IADLs and return to sport/leisure activities             Objective          Static Posture     Shoulders  Rounded.    Palpation     Additional Palpation Details  Mild TTP noted at the left anterior GH joint. Hypertonicity and tenerness noted at the left deltoid and UT     Active Range of Motion   Left Shoulder   Normal active range of motion    Right Shoulder   Normal active range of  motion  External rotation 0°: 66 degrees   Internal rotation BTB: T9     Additional Active Range of Motion Details  Left shoulder ROM to pain   - flexion - 142*  - abduction - 111*  - extension - 52*  - ER - 46  - IR - T12    Strength/Myotome Testing     Left Shoulder   Normal muscle strength    Right Shoulder   Normal muscle strength    Tests   Cervical     Left   Negative active compression ([place]).     Left Shoulder   Positive Hawkin's.   Negative drop arm, empty can and full can.           Assessment & Plan     Assessment  Impairments: abnormal muscle tone, abnormal or restricted ROM, activity intolerance, impaired physical strength, lacks appropriate home exercise program and pain with function  Functional Limitations: carrying objects, lifting, pulling, pushing, uncomfortable because of pain, reaching behind back, reaching overhead and unable to perform repetitive tasks  Assessment details: Patient is a 57 year old male who comes to physical therapy with c/o pain in the left shoulder. Signs and symptoms are consistent with left shoulder impingement resulting in pain, decreased ROM, decreased strength, and inability to perform all essential functional activities. Pt will benefit from skilled PT services to address the above issues.     Prognosis: good  Prognosis details: SHORT TERM GOALS:     2 weeks  1. Pt I w/ HEP  2. Pt to demonstrate PROM of the left shoulder to WFL to improve ability to perform ADL's  3. Pt to demonstrate ability to perform 30 minutes continuous activity in the clinic without increase in pain     LONG TERM GOALS:   6 weeks  1. Pt to demonstrate AROM of the left shoulder to WNL to allow ability to perform all necessary functional activities  2. Pt to demonstrate ability to lift 10# OH with the left arm without increase in pain  3. Pt to report being able to work full duty without increase in pain in the shoulder  4. Pt to tolerate 60 minutes continuous activity in the clinic without  increase in pain       Plan  Therapy options: will be seen for skilled therapy services  Planned modality interventions: cryotherapy, electrical stimulation/Russian stimulation, high voltage pulsed current (pain management), iontophoresis, microcurrent electrical stimulation, TENS, thermotherapy (hydrocollator packs) and ultrasound  Planned therapy interventions: abdominal trunk stabilization, ADL retraining, body mechanics training, flexibility, functional ROM exercises, home exercise program, IADL retraining, joint mobilization, manual therapy, neuromuscular re-education, postural training, soft tissue mobilization, spinal/joint mobilization, strengthening, stretching and therapeutic activities  Frequency: 2x week  Duration in weeks: 12  Treatment plan discussed with: patient            Timed:         Manual Therapy:    10     mins  54414;     Therapeutic Exercise:    16     mins  81349;     Neuromuscular Gumaro:        mins  55784;    Therapeutic Activity:          mins  77049;     Gait Training:           mins  93742;     Ultrasound:          mins  39906;    Ionto                                   mins   82447  Self Care                            mins   92739  Canalith Repos         mins 92450      Un-Timed:  Electrical Stimulation:         mins  87307 (MC );  Dry Needling          mins self-pay  Traction          mins 93135  Low Eval     25     Mins  04733  Mod Eval          Mins  60936  High Eval                            Mins  11763        Timed Treatment:   25   mins   Total Treatment:     51   mins          PT: Kris Minaya PT, DPT, OCS, Cert. DN   License Number: [id number]  Electronically signed by Kris Minaya PT, 08/29/22, 10:44 AM EDT    Certification Period: 8/29/2022 thru 11/26/2022  I certify that the therapy services are furnished while this patient is under my care.  The services outlined above are required by this patient, and will be reviewed every 90 days.         Physician  Signature:__________________________________________________    PHYSICIAN: Ad Capps MD  NPI: [id number]                                      DATE:      Please sign and return via fax to [email or web address] . Thank you, [facility].

## 2022-09-07 ENCOUNTER — TREATMENT (OUTPATIENT)
Dept: PHYSICAL THERAPY | Facility: CLINIC | Age: 57
End: 2022-09-07

## 2022-09-07 DIAGNOSIS — G89.29 CHRONIC LEFT SHOULDER PAIN: Primary | ICD-10-CM

## 2022-09-07 DIAGNOSIS — M25.512 CHRONIC LEFT SHOULDER PAIN: Primary | ICD-10-CM

## 2022-09-07 PROCEDURE — 97140 MANUAL THERAPY 1/> REGIONS: CPT | Performed by: PHYSICAL THERAPIST

## 2022-09-07 PROCEDURE — 97112 NEUROMUSCULAR REEDUCATION: CPT | Performed by: PHYSICAL THERAPIST

## 2022-09-07 PROCEDURE — 97110 THERAPEUTIC EXERCISES: CPT | Performed by: PHYSICAL THERAPIST

## 2022-09-12 NOTE — PROGRESS NOTES
Physical Therapy Daily Treatment Note      Patient: Abilio Wan   : 1965  Referring practitioner: Ad Capps MD  Date of Initial Visit: Type: THERAPY  Noted: 2022  Today's Date: 2022  Patient seen for 2 sessions    Certification Period 2022 thru 12/10/2022       Visit Diagnoses:    ICD-10-CM ICD-9-CM   1. Chronic left shoulder pain  M25.512 719.41    G89.29 338.29       Subjective     Pt states that he felt a little better with starting some exercises at home after his initial visit. He has some tightness this morning, but he denies any specific pain.     Objective   See Exercise, Manual, and Modality Logs for complete treatment.       Assessment/Plan     Pt was able to tolerate exercise in the clinic wihtout an exacerbation of symptoms. Will cont to progress as indicated.       Timed:         Manual Therapy:    16     mins  18752;     Therapeutic Exercise:    15     mins  96701;     Neuromuscular Gumaro:    25    mins  41336;    Therapeutic Activity:          mins  98950;     Gait Training:           mins  93641;     Ultrasound:          mins  55934;    Ionto                                   mins   91548  Self Care                            mins   01330  Canalith Repos         mins 04340  Electrical Stimulation:         mins  67067    Un-Timed:  Electrical Stimulation:         mins  21794 ( );  Dry Needling          mins self-pay  Traction          mins 17214      Timed Treatment:   56   mins   Total Treatment:     56   mins    Kris Minaya, PT, DPT, OCS, Cert. DN  [place] License: [id number]

## 2022-09-15 ENCOUNTER — TREATMENT (OUTPATIENT)
Dept: PHYSICAL THERAPY | Facility: CLINIC | Age: 57
End: 2022-09-15

## 2022-09-15 DIAGNOSIS — G89.29 CHRONIC LEFT SHOULDER PAIN: Primary | ICD-10-CM

## 2022-09-15 DIAGNOSIS — M25.512 CHRONIC LEFT SHOULDER PAIN: Primary | ICD-10-CM

## 2022-09-15 PROCEDURE — 97110 THERAPEUTIC EXERCISES: CPT | Performed by: PHYSICAL THERAPIST

## 2022-09-15 PROCEDURE — 97112 NEUROMUSCULAR REEDUCATION: CPT | Performed by: PHYSICAL THERAPIST

## 2022-09-15 PROCEDURE — 97140 MANUAL THERAPY 1/> REGIONS: CPT | Performed by: PHYSICAL THERAPIST

## 2022-09-15 NOTE — PROGRESS NOTES
Physical Therapy Daily Treatment Note      Patient: Abilio Wan   : 1965  Referring practitioner: Ad Capps MD  Date of Initial Visit: Type: THERAPY  Noted: 2022  Today's Date: 9/15/2022  Patient seen for 3 sessions    Certification Period 9/15/2022 thru 2022       Visit Diagnoses:    ICD-10-CM ICD-9-CM   1. Chronic left shoulder pain  M25.512 719.41    G89.29 338.29       Subjective     Pt states that he is feeling about the same, although he may have a little more motion in the shoulder since beginning treatment.     Objective   See Exercise, Manual, and Modality Logs for complete treatment.       Assessment/Plan     Progressed exercise in the clinic today with increasing resistance without reports of an increase in pain. Will cont to progress activity as indicated.       Timed:         Manual Therapy:    12     mins  56412;     Therapeutic Exercise:    15     mins  69720;     Neuromuscular Gumaro:    26    mins  78958;    Therapeutic Activity:          mins  98090;     Gait Training:           mins  77251;     Ultrasound:          mins  67560;    Ionto                                   mins   84784  Self Care                            mins   32715  Canalith Repos         mins 25249  Electrical Stimulation:         mins  73130    Un-Timed:  Electrical Stimulation:         mins  42032 (MC );  Dry Needling          mins self-pay  Traction          mins 21252      Timed Treatment:   53   mins   Total Treatment:     53   mins    Kris Minaya PT, DPT, OCS, Cert. DN  [place] License: [id number]

## 2022-09-21 ENCOUNTER — TREATMENT (OUTPATIENT)
Dept: PHYSICAL THERAPY | Facility: CLINIC | Age: 57
End: 2022-09-21

## 2022-09-21 DIAGNOSIS — M25.512 CHRONIC LEFT SHOULDER PAIN: Primary | ICD-10-CM

## 2022-09-21 DIAGNOSIS — G89.29 CHRONIC LEFT SHOULDER PAIN: Primary | ICD-10-CM

## 2022-09-21 PROCEDURE — 97140 MANUAL THERAPY 1/> REGIONS: CPT | Performed by: PHYSICAL THERAPIST

## 2022-09-21 PROCEDURE — 97112 NEUROMUSCULAR REEDUCATION: CPT | Performed by: PHYSICAL THERAPIST

## 2022-09-21 PROCEDURE — 97110 THERAPEUTIC EXERCISES: CPT | Performed by: PHYSICAL THERAPIST

## 2022-09-26 NOTE — PROGRESS NOTES
Physical Therapy Daily Treatment Note      Patient: Abilio Wan   : 1965  Referring practitioner: Ad Capps MD  Date of Initial Visit: Type: THERAPY  Noted: 2022  Today's Date: 2022  Patient seen for 4 sessions    Certification Period 2022 thru 2022       Visit Diagnoses:    ICD-10-CM ICD-9-CM   1. Chronic left shoulder pain  M25.512 719.41    G89.29 338.29       Subjective     Pt states that he feels like he is making improvement with therapy. He has some pain when reaching overhead, but is mostly pain free with the majority of tasks and with mid range movement.     Objective   See Exercise, Manual, and Modality Logs for complete treatment.       Assessment/Plan     Pt is progressing well and he demonstrates improvement in his ROM and overall strength with exercise in the clinic. WIll cont to progress as indicated.       Timed:         Manual Therapy:    10     mins  98886;     Therapeutic Exercise:    10     mins  70105;     Neuromuscular Gumaro:    34    mins  22152;    Therapeutic Activity:          mins  88500;     Gait Training:           mins  37850;     Ultrasound:          mins  61738;    Ionto                                   mins   32652  Self Care                            mins   59876  Canalith Repos         mins 65545  Electrical Stimulation:         mins  92256    Un-Timed:  Electrical Stimulation:         mins  76762 ( );  Dry Needling          mins self-pay  Traction          mins 27025      Timed Treatment:   54   mins   Total Treatment:     54   mins    Kris Minaya, PT, DPT, OCS, Cert. DN  [place] License: [id number]

## 2022-09-27 ENCOUNTER — TREATMENT (OUTPATIENT)
Dept: PHYSICAL THERAPY | Facility: CLINIC | Age: 57
End: 2022-09-27

## 2022-09-27 DIAGNOSIS — M25.512 CHRONIC LEFT SHOULDER PAIN: Primary | ICD-10-CM

## 2022-09-27 DIAGNOSIS — G89.29 CHRONIC LEFT SHOULDER PAIN: Primary | ICD-10-CM

## 2022-09-27 PROCEDURE — 97530 THERAPEUTIC ACTIVITIES: CPT | Performed by: PHYSICAL THERAPIST

## 2022-09-27 PROCEDURE — 97110 THERAPEUTIC EXERCISES: CPT | Performed by: PHYSICAL THERAPIST

## 2022-09-27 PROCEDURE — 97112 NEUROMUSCULAR REEDUCATION: CPT | Performed by: PHYSICAL THERAPIST

## 2022-09-30 NOTE — PROGRESS NOTES
Physical Therapy Daily Treatment Note      Patient: Abilio Wan   : 1965  Referring practitioner: Ad Capps MD  Date of Initial Visit: Type: THERAPY  Noted: 2022  Today's Date: 2022  Patient seen for 5 sessions    Certification Period 2022 thru 2022       Visit Diagnoses:    ICD-10-CM ICD-9-CM   1. Chronic left shoulder pain  M25.512 719.41    G89.29 338.29       Subjective     Pt states that he is feeling improvement in the shoulder and he has very little to no pain at rest. He still gets some tightness when reaching overhead, but otherwise is not limited.     Objective   See Exercise, Manual, and Modality Logs for complete treatment.       Assessment/Plan     Pt has progressed very well and he is able to perform dynamic and functional activities in the clinic without exacerbation of symptoms. Will cont to progress as indicated with possible DC soon.       Timed:         Manual Therapy:         mins  20430;     Therapeutic Exercise:    13     mins  22823;     Neuromuscular Gumaro:    30    mins  28116;    Therapeutic Activity:     12     mins  29008;     Gait Training:           mins  37940;     Ultrasound:          mins  84126;    Ionto                                   mins   50086  Self Care                            mins   41961  Canalith Repos         mins 70810  Electrical Stimulation:         mins  06960    Un-Timed:  Electrical Stimulation:         mins  94512 ( );  Dry Needling          mins self-pay  Traction          mins 32144      Timed Treatment:   55   mins   Total Treatment:     55   mins    Kris Minaya, PT, DPT, OCS, Cert. DN  [place] License: [id number]

## 2022-10-05 ENCOUNTER — TREATMENT (OUTPATIENT)
Dept: PHYSICAL THERAPY | Facility: CLINIC | Age: 57
End: 2022-10-05

## 2022-10-05 DIAGNOSIS — G89.29 CHRONIC LEFT SHOULDER PAIN: Primary | ICD-10-CM

## 2022-10-05 DIAGNOSIS — M25.512 CHRONIC LEFT SHOULDER PAIN: Primary | ICD-10-CM

## 2022-10-05 PROCEDURE — 97112 NEUROMUSCULAR REEDUCATION: CPT | Performed by: PHYSICAL THERAPIST

## 2022-10-05 PROCEDURE — 97530 THERAPEUTIC ACTIVITIES: CPT | Performed by: PHYSICAL THERAPIST

## 2022-10-05 PROCEDURE — 97110 THERAPEUTIC EXERCISES: CPT | Performed by: PHYSICAL THERAPIST

## 2022-10-12 NOTE — PROGRESS NOTES
Physical Therapy Daily Treatment Note  [facility]  [address], [address] [place] [address]      Patient: Abilio Wan   : 1965  Referring practitioner: Ad Capps MD  Date of Initial Visit: Type: THERAPY  Noted: 2022  Today's Date: 10/11/2022  Patient seen for 6 sessions    Certification Period 10/11/2022 thru 2023       Visit Diagnoses:    ICD-10-CM ICD-9-CM   1. Chronic left shoulder pain  M25.512 719.41    G89.29 338.29       Subjective     Pt states that he is feeling improvement in his shoulder pain and he is able to perform most activities without a significant increase in pain in the shoulder. He still has some mild discomfort at end range flexion.     Objective   See Exercise, Manual, and Modality Logs for complete treatment.       Assessment/Plan     Pt is progressing well and he demonstrates an improvement in his shoulder motion and tolerance to activity in the clinic. Will cont to progress as indicated.       Timed:         Manual Therapy:         mins  40905;     Therapeutic Exercise:    13     mins  31756;     Neuromuscular Gumaro:    30    mins  76781;    Therapeutic Activity:     10     mins  87233;     Gait Training:           mins  64530;     Ultrasound:          mins  83019;    Ionto                                   mins   48808  Self Care                            mins   99270  Canalith Repos         mins 66831  Electrical Stimulation:         mins  71495    Un-Timed:  Electrical Stimulation:         mins  11265 ( );  Dry Needling          mins self-pay  Traction          mins 14110      Timed Treatment:   53   mins   Total Treatment:     53   mins    Kris Minaya, PT, DPT, OCS, Cert. DN  [place] License: [id number]

## 2022-10-20 ENCOUNTER — TREATMENT (OUTPATIENT)
Dept: PHYSICAL THERAPY | Facility: CLINIC | Age: 57
End: 2022-10-20

## 2022-10-20 DIAGNOSIS — G89.29 CHRONIC LEFT SHOULDER PAIN: Primary | ICD-10-CM

## 2022-10-20 DIAGNOSIS — M25.512 CHRONIC LEFT SHOULDER PAIN: Primary | ICD-10-CM

## 2022-10-20 PROCEDURE — 97110 THERAPEUTIC EXERCISES: CPT | Performed by: PHYSICAL THERAPIST

## 2022-10-20 PROCEDURE — 97112 NEUROMUSCULAR REEDUCATION: CPT | Performed by: PHYSICAL THERAPIST

## 2022-10-20 PROCEDURE — 97530 THERAPEUTIC ACTIVITIES: CPT | Performed by: PHYSICAL THERAPIST

## 2022-10-24 ENCOUNTER — TREATMENT (OUTPATIENT)
Dept: PHYSICAL THERAPY | Facility: CLINIC | Age: 57
End: 2022-10-24

## 2022-10-24 DIAGNOSIS — M25.512 CHRONIC LEFT SHOULDER PAIN: Primary | ICD-10-CM

## 2022-10-24 DIAGNOSIS — G89.29 CHRONIC LEFT SHOULDER PAIN: Primary | ICD-10-CM

## 2022-10-24 PROCEDURE — 97110 THERAPEUTIC EXERCISES: CPT | Performed by: PHYSICAL THERAPIST

## 2022-10-24 PROCEDURE — 97530 THERAPEUTIC ACTIVITIES: CPT | Performed by: PHYSICAL THERAPIST

## 2022-10-24 PROCEDURE — 97112 NEUROMUSCULAR REEDUCATION: CPT | Performed by: PHYSICAL THERAPIST

## 2022-10-26 NOTE — PROGRESS NOTES
Physical Therapy Daily Treatment Note    [facility]   [address], [address] [place] [address]      Patient: Abilio Wan   : 1965  Referring practitioner: Ad Capps MD  Date of Initial Visit: Type: THERAPY  Noted: 2022  Today's Date: 10/25/2022  Patient seen for 7 sessions    Certification Period 10/25/2022 thru 2023       Visit Diagnoses:    ICD-10-CM ICD-9-CM   1. Chronic left shoulder pain  M25.512 719.41    G89.29 338.29       Subjective     Pt states that he has been feeling very good recently and he only has minimal pain at the very end of his ROM. He has been able to use the arm for most of his usual daily activities.     Objective   See Exercise, Manual, and Modality Logs for complete treatment.       Assessment/Plan     Pt is making steady progress and he was able to perform all strengthening activity in the clinic without exacerbation of symptoms. WIll cont to progress as indicated.       Timed:         Manual Therapy:         mins  56349;     Therapeutic Exercise:    13     mins  74744;     Neuromuscular Gumaro:    13    mins  34522;    Therapeutic Activity:     30     mins  66393;     Gait Training:           mins  53032;     Ultrasound:          mins  98125;    Ionto                                   mins   59690  Self Care                            mins   85691  Canalith Repos         mins 26002  Electrical Stimulation:         mins  58129    Un-Timed:  Electrical Stimulation:         mins  73577 ( );  Dry Needling          mins self-pay  Traction          mins 80939      Timed Treatment:   56   mins   Total Treatment:     56   mins    Kris Minaya, PT, DPT, OCS, Cert. DN  [place] License: [id number]

## 2022-10-27 NOTE — PROGRESS NOTES
Physical Therapy Daily Treatment Note    [facility]   [address], [address] [place] [address]      Patient: Abilio Wan   : 1965  Referring practitioner: Ad Capps MD  Date of Initial Visit: Type: THERAPY  Noted: 2022  Today's Date: 10/27/2022  Patient seen for 8 sessions    Certification Period 10/27/2022 thru 2023       Visit Diagnoses:    ICD-10-CM ICD-9-CM   1. Chronic left shoulder pain  M25.512 719.41    G89.29 338.29       Subjective     Pt states that he feels like he is doing very well and she only has minimal discomfort when reaching as far as he can overhead. He reports compliance with his HEP    Objective   See Exercise, Manual, and Modality Logs for complete treatment.       Assessment/Plan     Pt is progressing very well and she dermonstrates an improved strength and tolerance to functional activity. Will likely DC after his next visit.       Timed:         Manual Therapy:         mins  82640;     Therapeutic Exercise:    10     mins  80088;     Neuromuscular Gumaro:    13    mins  86958;    Therapeutic Activity:     30     mins  97343;     Gait Training:           mins  91175;     Ultrasound:          mins  51585;    Ionto                                   mins   35578  Self Care                            mins   98896  Canalith Repos         mins 79104  Electrical Stimulation:         mins  46492    Un-Timed:  Electrical Stimulation:         mins  80555 ( );  Dry Needling          mins self-pay  Traction          mins 89354      Timed Treatment:   53   mins   Total Treatment:     53   mins    Kris Minaya, PT, DPT, OCS, Cert. DN  [place] License: [id number]

## 2022-10-31 ENCOUNTER — TELEPHONE (OUTPATIENT)
Dept: PHYSICAL THERAPY | Facility: CLINIC | Age: 57
End: 2022-10-31

## 2023-02-24 DIAGNOSIS — C61 PROSTATE CANCER: Primary | ICD-10-CM

## 2023-02-27 ENCOUNTER — LAB (OUTPATIENT)
Dept: LAB | Facility: HOSPITAL | Age: 58
End: 2023-02-27
Payer: COMMERCIAL

## 2023-02-27 DIAGNOSIS — C61 PROSTATE CANCER: ICD-10-CM

## 2023-02-27 LAB — PSA SERPL-MCNC: <0.014 NG/ML (ref 0–4)

## 2023-02-27 PROCEDURE — 36415 COLL VENOUS BLD VENIPUNCTURE: CPT

## 2023-02-27 PROCEDURE — 84153 ASSAY OF PSA TOTAL: CPT

## 2023-03-03 ENCOUNTER — HOSPITAL ENCOUNTER (OUTPATIENT)
Dept: RADIATION ONCOLOGY | Facility: HOSPITAL | Age: 58
Setting detail: RADIATION/ONCOLOGY SERIES
Discharge: HOME | End: 2023-03-03
Payer: COMMERCIAL

## 2023-03-03 ENCOUNTER — OFFICE VISIT (OUTPATIENT)
Dept: RADIATION ONCOLOGY | Facility: HOSPITAL | Age: 58
End: 2023-03-03
Payer: COMMERCIAL

## 2023-03-03 VITALS
HEART RATE: 69 BPM | SYSTOLIC BLOOD PRESSURE: 129 MMHG | BODY MASS INDEX: 32.44 KG/M2 | OXYGEN SATURATION: 96 % | WEIGHT: 231.7 LBS | DIASTOLIC BLOOD PRESSURE: 81 MMHG | RESPIRATION RATE: 16 BRPM | TEMPERATURE: 97 F | HEIGHT: 71 IN

## 2023-03-03 DIAGNOSIS — C61 PROSTATE CANCER: Primary | ICD-10-CM

## 2023-03-03 PROCEDURE — G0463 HOSPITAL OUTPT CLINIC VISIT: HCPCS

## 2023-03-03 NOTE — PROGRESS NOTES
FOLLOW UP NOTE    PATIENT:                                                      Abilio Wan  MEDICAL RECORD #:                        [id number]  :                                                          1965  COMPLETION DATE:   2015  DIAGNOSIS:     Prostate cancer (HCC)  - Stage III (rT3a, N0, M0, PSA: Less than 10, Vicco 7)      BRIEF HISTORY:    Routine follow-up visit.   He is status post prostatectomy 2011.   He developed subsequent biochemical recurrence with PSA 0.21 ng/ml.    He was treated with salvage pelvic irradiation with short course hormonal therapy completing 2015.  He feels well and has no complaints.    No voiding difficulties.  Denies hematuria, dysuria, or incontinence.  Bowels are regular.  He reports good effect with prn sildenafil 40-60 mg for chronic ED.  No new aches or pains.      PSA:      0.07 ng/ml   10-      0.02 ng/ml   2018      0.04 ng/ml   2018       0.06 ng/ml   2019  <0.014 ng/ml   7-  <0.014 ng/ml   2-  <0.014 ng/ml   8-  <0.014 ng/ml   2021  <0.014 ng/ml   2022  <0.014 ng/ml   2023    MEDICATIONS: Medication reconciliation for the patient was reviewed and confirmed in the electronic medical record.    Review of Systems   All other systems reviewed and are negative.          IPSS Questionnaire (AUA-7):  Over the past month…     1)  Incomplete Emptying  How often have you had a sensation of not emptying your bladder?  0 - Not at all   2)  Frequency  How often have you had to urinate less than every two hours? 0 - Not at all   3)  Intermittency  How often have you found you stopped and started again several times when you urinated?  0 - Not at all   4) Urgency  How often have you found it difficult to postpone urination?  0 - Not at all   5) Weak Stream  How often have you had a weak urinary stream?  0 - Not at all   6) Straining  How often have you had to push or strain to begin  urination?  0 - Not at all   7) Nocturia  How many times did you typically get up at night to urinate?  0 - None   Total Score:  0         Quality of life due to urinary symptoms:  If you were to spend the rest of your life with your urinary condition the way it is now, how would you feel about that? 0-Delighted   Urine Leakage (Incontinence) 0-No Leakage      Sexual Health Inventory  Current Status     1)  How do you rate your confidence that you could achieve and keep an erection? 4-High   2) When you had erections with sexual stimulation, how often were your erections hard enough for penetration (entering your partner)? 5-Almost always or always   3)  During sexual intercourse, how often were you able to maintain your erection after you had penetrated (entered) into your partner? 5-Almost always or always   4) During sexual intercourse, how difficult was it to maintain your erection to completion of intercourse? 5-Not difficult   5) When you attempted sexual intercourse, how often was it satisfactory to you? 5-Almost always or always   Total Score: 24         Bowel Health Inventory  Current Status: 0-No problems, no rectal bleeding, no discharge, less then 5 bowel movements a day                     Physical Exam  Vitals and nursing note reviewed.   Constitutional:       Appearance: He is well-developed.   HENT:      Head: Normocephalic and atraumatic.   Cardiovascular:      Rate and Rhythm: Normal rate and regular rhythm.      Heart sounds: Normal heart sounds. No murmur heard.  Pulmonary:      Effort: Pulmonary effort is normal.      Breath sounds: Normal breath sounds. No wheezing or rales.   Abdominal:      General: Bowel sounds are normal. There is no distension.      Palpations: Abdomen is soft.      Tenderness: There is no abdominal tenderness.   Musculoskeletal:         General: No tenderness. Normal range of motion.      Cervical back: Normal range of motion and neck supple.   Lymphadenopathy:       "Cervical: No cervical adenopathy.      Upper Body:      Right upper body: No supraclavicular adenopathy.      Left upper body: No supraclavicular adenopathy.   Skin:     General: Skin is warm and dry.   Neurological:      Mental Status: He is alert and oriented to person, place, and time.      Sensory: No sensory deficit.   Psychiatric:         Behavior: Behavior normal.         Thought Content: Thought content normal.         Judgment: Judgment normal.         VITAL SIGNS:   Vitals:    03/03/23 0822   BP: 129/81   Pulse: 69   Resp: 16   Temp: 97 °F (36.1 °C)   TempSrc: Skin   SpO2: 96%  Comment: RA   Weight: 105 kg (231 lb 11.2 oz)   Height: 180.3 cm (71\")   PainSc: 0-No pain                   KSP %:      The following portions of the patient's history were reviewed and updated as appropriate: allergies, current medications, past family history, past medical history, past social history, past surgical history and problem list.         Diagnoses and all orders for this visit:    1. Prostate cancer (HCC) (Primary)         IMPRESSION:  Prostate cancer, 11 years after prostatectomy, more than 7 years after salvage pelvic irradiation/short course androgen ablation.  PSA has remained undetectable since July 2019, indicating complete remission status with no evidence of residual disease.    RECOMMENDATIONS:  Annual PSA.  This can be incorporated into his annual blood work with his primary physician.  I will be happy to be available, if needed, in the future.    I spent a total of 15 minutes on todays visit, with more than 10 minutes in direct face to face communication, and the remainder of the time spent in reviewing the relevant history, records, available imaging, and for documentation.    Return if symptoms worsen or fail to improve.    Royal Espinoza MD      "

## 2024-03-04 ENCOUNTER — PATIENT MESSAGE (OUTPATIENT)
Dept: FAMILY MEDICINE CLINIC | Facility: CLINIC | Age: 59
End: 2024-03-04
Payer: COMMERCIAL

## 2024-03-04 DIAGNOSIS — C61 PROSTATE CANCER: Primary | ICD-10-CM

## 2024-03-04 NOTE — TELEPHONE ENCOUNTER
From: Abiloi Wan  To: Patrick Baker  Sent: 3/4/2024 11:21 AM EST  Subject: PSA test    Dr. Baker,    I was a patient with Dr. Royal Espinoza for prostate cancer. Last year, he officially released me because my cancer is in remission. He always scheduled a year PSA for me just to check for any recurrence. Could you schedule a PSA for me?    Thank you.    Deepak Wan

## 2024-03-07 ENCOUNTER — LAB (OUTPATIENT)
Dept: LAB | Facility: HOSPITAL | Age: 59
End: 2024-03-07
Payer: COMMERCIAL

## 2024-03-07 DIAGNOSIS — C61 PROSTATE CANCER: ICD-10-CM

## 2024-03-07 LAB — PSA SERPL-MCNC: <0.014 NG/ML (ref 0–4)

## 2024-03-07 PROCEDURE — 84153 ASSAY OF PSA TOTAL: CPT

## 2024-03-07 PROCEDURE — 36415 COLL VENOUS BLD VENIPUNCTURE: CPT

## 2024-03-14 ENCOUNTER — OFFICE VISIT (OUTPATIENT)
Age: 59
End: 2024-03-14
Payer: COMMERCIAL

## 2024-03-14 ENCOUNTER — HOSPITAL ENCOUNTER (OUTPATIENT)
Dept: GENERAL RADIOLOGY | Facility: HOSPITAL | Age: 59
Discharge: HOME | End: 2024-03-14
Admitting: STUDENT IN AN ORGANIZED HEALTH CARE EDUCATION/TRAINING PROGRAM
Payer: COMMERCIAL

## 2024-03-14 VITALS
WEIGHT: 211 LBS | DIASTOLIC BLOOD PRESSURE: 86 MMHG | BODY MASS INDEX: 29.54 KG/M2 | SYSTOLIC BLOOD PRESSURE: 132 MMHG | HEIGHT: 71 IN

## 2024-03-14 DIAGNOSIS — Q68.8 OS TRIGONUM: ICD-10-CM

## 2024-03-14 DIAGNOSIS — M25.572 LEFT ANKLE PAIN, UNSPECIFIED CHRONICITY: Primary | ICD-10-CM

## 2024-03-14 DIAGNOSIS — M76.62 LEFT ACHILLES TENDINITIS: Primary | ICD-10-CM

## 2024-03-14 DIAGNOSIS — M25.572 LEFT ANKLE PAIN, UNSPECIFIED CHRONICITY: ICD-10-CM

## 2024-03-14 PROCEDURE — 73610 X-RAY EXAM OF ANKLE: CPT

## 2024-03-14 NOTE — PROGRESS NOTES
[facility] Office Visit     Office Visit       Date: 03/14/2024   Patient Name: Abilio Wan  MRN: [id number]  YOB: 1965    Referring Physician: Referring, Self     Chief Complaint:   Chief Complaint   Patient presents with    Left Ankle - Pain       History of Present Illness:   Abilio Wan is a 59 y.o. male who presents with new problem of: left ankle pain.  Onset:  running on the treadmill . The issue has been ongoing for 2 month(s). Pain is a 2/10 on the pain scale. Pain is described as dull. Associated symptoms include pain. The pain is worse with walking; resting and sitting improve the pain. Previous treatments have included: NSAIDS.    Subjective   Review of Systems: Review of Systems   Constitutional:  Negative for chills, fever, unexpected weight gain and unexpected weight loss.   HENT:  Negative for congestion, postnasal drip and rhinorrhea.    Eyes:  Negative for blurred vision.   Respiratory:  Negative for shortness of breath.    Cardiovascular:  Negative for leg swelling.   Gastrointestinal:  Negative for abdominal pain, nausea and vomiting.   Genitourinary:  Negative for difficulty urinating.   Musculoskeletal:  Positive for arthralgias. Negative for gait problem, joint swelling and myalgias.   Skin:  Negative for skin lesions and wound.   Neurological:  Negative for dizziness, weakness, light-headedness and numbness.   Hematological:  Does not bruise/bleed easily.   Psychiatric/Behavioral:  Negative for depressed mood.         I have reviewed the following portions of the patient's history:History of Present Illness and review of systems.    Past Medical History:   Past Medical History:   Diagnosis Date    Achilles rupture, right 2004    H/O colonoscopy     Prostate cancer 12/2011       Past Surgical History:   Past Surgical History:   Procedure Laterality Date    ACHILLES TENDON SURGERY      1997    PROSTATECTOMY  12/2011  "   TOE AMPUTATION  1978    TONSILLECTOMY  1970    VASECTOMY         Family History:   Family History   Adopted: Yes       Social History:   Social History     Socioeconomic History    Marital status:    Tobacco Use    Smoking status: Never    Smokeless tobacco: Never   Vaping Use    Vaping status: Never Used   Substance and Sexual Activity    Alcohol use: Yes     Alcohol/week: 6.0 standard drinks of alcohol     Types: 6 Glasses of wine per week     Comment: less than 1 per week for 30 years    Drug use: No    Sexual activity: Defer       Medications:   Current Outpatient Medications:     Multiple Vitamins-Minerals (MULTIVITAMIN PO), Take  by mouth. As directed, Disp: , Rfl:     sildenafil (REVATIO) 20 MG tablet, Take 1 tablet by mouth As Needed (as needed for ED). 1-5 tablets daily as needed, Disp: 90 tablet, Rfl: 6    Allergies: No Known Allergies    I reviewed the patient's chief complaint, history of present illness, review of systems, past medical history, surgical history, family history, social history, medications and allergy list.     Objective    Vital Signs:   Vitals:    03/14/24 1500   BP: 132/86   Weight: 95.7 kg (211 lb)   Height: 179.1 cm (70.5\")     Body mass index is 29.85 kg/m².   BMI is >= 25 and <30. (Overweight) The following options were offered after discussion;: exercise counseling/recommendations and nutrition counseling/recommendations     Patient reports that he is a non-smoker and has not ever been a smoker.  This behavior was applauded and he was encouraged to continue in smoking cessation.  We will continue to monitor at subsequent visits.    Ortho Exam:  Constitutional: General Appearance: healthy-appearing, NAD, and normal body habitus.   Psychiatric: Orientation: oriented to time, place, and person. Mood and Affect: normal mood and affect and active and alert.   Cardiovascular System: Arterial Pulses Right: dorsalis pedis normal. Arterial Pulses Left: dorsalis pedis normal. " Varicosities Right: capillary refill test normal. Varicosities Left: capillary refill test normal.   Gait and Station: Appearance: normal gait, no limp, and ambulating with no assistive devices.   Ankles and Feet: Inspection Right: no erythema, induration, swelling, warmth, or deformity and normal alignment. Inspection Left: no erythema, induration, swelling, warmth, or deformity and normal alignment. Bony Palpation of the Ankle/Foot Right: no tenderness of the ankle. Bony Palpation of the Ankle/Foot Left: no tenderness of the ankle and no tenderness of the achilles tendon insertion. Soft Tissue Palpation of the Ankle/Foot Right: no tenderness of the achilles tendon. Soft Tissue Palpation of the Ankle/Foot Left: tenderness of the achilles tendon. Active Range of Motion Right: dorsiflexion normal. Active Range of Motion Left: dorsiflexion (10 deg.). Stability Right: anterior drawer negative and talar tilt negative. Stability Left: anterior drawer negative and talar tilt negative. Strength Right: extensor digitorum longus (5/5) and brevis (5/5); extensor hallucis longus (5/5); peroneus longus (5/5) and brevis (5/5); and posterior tibialis (5/5), tibialis anterior (5/5), and gastrocnemius (5/5). Strength Left: extensor digitorum longus (5/5) and brevis (5/5); extensor hallucis longus (5/5); peroneus longus (5/5) and brevis (5/5); and posterior tibialis (5/5), tibialis anterior (5/5), and gastrocnemius (5/5).   Neurological System: Sensation on the Right: normal distal extremities. Sensation on the Left: normal distal extremities.   Skin: Right Lower Extremity: normal. Left Lower Extremity: normal.    Results Review:   Imaging Results (Last 24 Hours)       ** No results found for the last 24 hours. **        I personally reviewed the radiographs of the left ankle from 3/14/2024.  No acute fracture or dislocation.  No significant degenerative changes.  Small plantar calcaneal enthesophyte.  Os trigonum noted within the  posterior ankle.    Procedures    Assessment / Plan    Assessment/Plan:   Diagnoses and all orders for this visit:    1. Left Achilles tendinitis (Primary)  -     Ambulatory Referral to Physical Therapy Evaluate and treat, Ortho; Heat, Electrotherapy; Moist heat, Ultrasound, Phonophoresis; E-stim (dexamethasone with iontophoresis), Iontophoresis, Tens (Home); Cross Fiber, Desensitization, Soft Tissue Mobili...    2. Os trigonum    Left posterior ankle pain for the last 2 months.  That time, he was walking extensively on the treadmill.  He is stopped walking as much but his symptoms have not completely resolved.  He denies any acute mechanism of injury.  He is also been taking naproxen.  No previous history of ankle injury or surgery.  His radiographs show no acute osseous abnormalities.  He has an os trigonum in the posterior ankle.  However, exam he is not significantly symptomatic.  He is tender to the mid substance of his Achilles tendon.  I discussed treatment from a conservative standpoint anti-inflammatory medication, physical therapy (boot immobilization.  He is not walking with significant limp today so our treatment plan will consist of naproxen and a referral to physical therapy.  PT will include eccentric strengthening exercises and dexamethasone with iontophoresis. I will see him back in 6 weeks.    Previous imaging studies reviewed: 3/14/2024-radiographs of the left ankle.    Previous laboratory results reviewed: 7/29/2021-creatinine 0.76, EGFR 106.  TSH 2.100.    Follow Up:   Return in about 6 weeks (around 4/25/2024) for Recheck.      Trav Wahl MD  [facility]

## 2024-03-20 ENCOUNTER — TREATMENT (OUTPATIENT)
Dept: PHYSICAL THERAPY | Facility: CLINIC | Age: 59
End: 2024-03-20
Payer: COMMERCIAL

## 2024-03-20 DIAGNOSIS — M76.62 LEFT ACHILLES TENDINITIS: Primary | ICD-10-CM

## 2024-03-20 DIAGNOSIS — M76.60 ACHILLES TENDON PAIN: Primary | ICD-10-CM

## 2024-03-20 RX ORDER — DEXAMETHASONE SODIUM PHOSPHATE 4 MG/ML
4 INJECTION, SOLUTION INTRA-ARTICULAR; INTRALESIONAL; INTRAMUSCULAR; INTRAVENOUS; SOFT TISSUE AS NEEDED
Qty: 30 ML | Refills: 0 | Status: SHIPPED | OUTPATIENT
Start: 2024-03-20

## 2024-03-20 NOTE — PROGRESS NOTES
Physical Therapy Initial Evaluation and Plan of Care             [address]    [place] [address]    Patient: Abilio Wan   : 1965  Diagnosis/ICD-10 Code:  Achilles tendon pain [M76.60]  Referring practitioner: Ramon Wahl MD  Date of Initial Visit: 3/20/2024  Today's Date: 3/20/2024  Patient seen for 1 session         Visit Diagnoses:    ICD-10-CM ICD-9-CM   1. Achilles tendon pain  M76.60 727.89         Subjective Questionnaire: LEFS: 71/80      Subjective Evaluation    History of Present Illness  Mechanism of injury: L achilles pain started in January while running on the treadmill. Held off on running rested for about 2 months, but symptoms have not gone away. Went to see ortho doc last week.    Walking short distances and around the house is not painful, but walking more than a mile brings on symptoms and he starts to limp.  Soreness carries into next day. Has held off running for now.   No change in sxs throughout the day. No c/o sleep disturbances. Pain localized to mid achilles tendon. Gets better with rest. Has not tried ice/heat. Anti-inflammatories as needed. No problems with stairs. Denies N/T. Denies swelling.     Plays golf    Subjective comment: L achilles pain  Patient Occupation: Analytics-sedentary Quality of life: good    Pain  Current pain ratin  At worst pain rating: 3    Social Support  Lives in: multiple-level home  Lives with: spouse (wife,  for 24 years)    Diagnostic Tests  X-ray: normal (No acute bony abnormality or significant degenerative change of the left ankle)    Patient Goals  Patient goals for therapy: decreased pain, return to sport/leisure activities, increased strength and increased motion             Treatment  See Exercise, Manual, and Modality Logs for complete treatment.     Access Code: [email or web address]  URL: [email or web address]  Date: 2024  Prepared by: Neeta España    Exercises  - Standing Gastroc Stretch  - 2-3 x  daily - 3 reps - 20-30 sec hold  - Standing Soleus Stretch  - 2-3 x daily - 3 reps - 20-30 sec hold  - Standing Eccentric Heel Raise  - 1-2 x daily - 1-2 sets - 10-15 reps    Objective          Static Posture     Ankle/Foot   Ankle/Foot (Left): Calcaneovalgus and pronated.   Ankle/Foot (Right): Supinated.     Palpation     Additional Palpation Details  TTP (-) L gastroc/soleus complex      Tenderness     Additional Tenderness Details  TTP (-) L Achilles, calcaneus      Active Range of Motion   Left Ankle/Foot   Dorsiflexion (ke): 14 degrees   Plantar flexion: 40 degrees   Inversion: 10 degrees   Eversion: 0 degrees     Right Ankle/Foot   Dorsiflexion (ke): 20 degrees   Plantar flexion: 36 degrees   Inversion: 24 degrees   Eversion: 30 degrees     Additional Active Range of Motion Details  CKC DF (standing w/ foot on mat table)- L 36 deg, R 34 deg    Joint Play   Left Ankle/Foot  Joints within functional limits are the talocrural joint.     Right Ankle/Foot  Joints within functional limits are the talocrural joint.     Strength/Myotome Testing     Left Hip   Planes of Motion   Flexion: 4+    Right Hip   Planes of Motion   Flexion: 5    Left Knee   Flexion: 5  Extension: 5    Right Knee   Flexion: 5  Extension: 5    Left Ankle/Foot   Dorsiflexion: 5  Plantar flexion: 5  Inversion: 5  Eversion: 5  Great toe extension: 5    Right Ankle/Foot   Dorsiflexion: 5  Plantar flexion: 5  Inversion: 5  Eversion: 5  Great toe extension: 5    Ambulation     Observational Gait   Gait: within functional limits     Functional Assessment     Comments  DL jumps- non provoking  SL jumps- mildly provoking to L achilles w/ L foot push off            Assessment & Plan       Assessment  Impairments: abnormal or restricted ROM, activity intolerance, lacks appropriate home exercise program and pain with function   Functional limitations: walking, uncomfortable because of pain and unable to perform repetitive tasks   Assessment details: Pt is a  59 YOM who presents to PT with complaint of L achilles pain. Primary impairments include L ankle mobility deficits and pain with activity such as running or walking longer distances. Symptoms provoked by SL jumps with L foot push off and SL heel lifts. Findings consistent with achilles tendinopathy. Pt would benefit from skilled PT services to decrease pain and allow return to full recreational activities without limitation.  Barriers to therapy: N/A    Goals  Plan Goals: LTG 6 weeks  1) Pt to be compliant with initial HEP and symptom management.   2) Pt to increase L ankle INV/EV mobility to be symmetrical with contralateral limb to improve functional mobility.   3) Pt to tolerate walking or running 1 mile with little to no pain to demonstrate improvement in symptom irritability and tolerance to recreational activities.     Plan  Therapy options: will be seen for skilled therapy services  Planned modality interventions: cryotherapy, dry needling, electrical stimulation/Russian stimulation, TENS, thermotherapy (hydrocollator packs), traction and ultrasound  Planned therapy interventions: flexibility, functional ROM exercises, home exercise program, IADL retraining, manual therapy, motor coordination training, strengthening, stretching and therapeutic activities  Frequency: 2x week  Duration in weeks: 12  Treatment plan discussed with: patient  Plan details: TE/TA/NMR to address deficits and decrease pain. Modalities as needed for symptom management including ionto w/ dex as recommended by Ortho        History # of Personal Factors and/or Comorbidities: LOW (0)  Examination of Body System(s): # of elements: LOW (1-2)  Clinical Presentation: EVOLVING  Clinical Decision Making: LOW       Timed:         Manual Therapy:    0     mins  37162;     Therapeutic Exercise:    0     mins  24609;     Neuromuscular Gumaro:    0    mins  49993;    Therapeutic Activity:     10     mins  83743;     Gait Trainin     mins   29689;     Ultrasound:     0     mins  69098;    Ionto                               0    mins   58750  Self Care                       0     mins   73996  Canalith Repos    0     mins 02489      Un-Timed:  Electrical Stimulation:    0     mins  05693 ( );  Dry Needling     0     mins self-pay  Traction     0     mins 92485  Low Eval     30     Mins  24465  Mod Eval     0     Mins  31613  High Eval                       0     Mins  25745        Timed Treatment:   10   mins   Total Treatment:     40   mins      Laure Myers Physical Therapist Student completed treatment under my direct supervision.     03/20/2024        PT: Neeta España, PT     [place] License: #[id number]    Electronically signed by Laure Myers, PT Student, 03/20/24, 2:27 PM EDT    Certification Period: 3/20/2024 thru 6/17/2024  I certify that the therapy services are furnished while this patient is under my care.  The services outlined above are required by this patient, and will be reviewed every 90 days.         Physician Signature:__________________________________________________    PHYSICIAN: Ramon Wahl MD      DATE:     Please sign and return via fax to [phone]. Thank you, [facility].

## 2024-03-26 ENCOUNTER — TREATMENT (OUTPATIENT)
Dept: PHYSICAL THERAPY | Facility: CLINIC | Age: 59
End: 2024-03-26
Payer: COMMERCIAL

## 2024-03-26 DIAGNOSIS — M76.60 ACHILLES TENDON PAIN: Primary | ICD-10-CM

## 2024-03-26 NOTE — PROGRESS NOTES
Physical Therapy Daily Treatment Note                  [address]  [place] [address]      Patient: Abilio Wan   : 1965  Diagnosis/ICD-10 Code:  Achilles tendon pain [M76.60]  Referring practitioner: Ramon Wahl MD  Date of Initial Visit: Type: THERAPY  Noted: 3/20/2024  Today's Date: 3/26/2024  Patient seen for 2 sessions             Subjective   Abilio Wan reports: went to walk around the mall over the weekend and after a while it started to ache in the left achilles. Doing the stretches and exercise given daily.     Objective          Tenderness   Left Ankle/Foot   Tenderness in the proximal Achilles.       Noted increase edema on the L compared to R. Hypomobile proximal achilles compared to more distal portion but improved with manual.    See Exercise, Manual, and Modality Logs for complete treatment.       Assessment/Plan  Started session with mobilizing the left achilles followed by IASTM generally through out. Completed stretches at end range available and eccentric activities. Ended with dexamethasone as ordered by MD to the achilles with precautions and use of patch.   Progress per Plan of Care and Progress strengthening /stabilization /functional activity           Timed:  Manual Therapy:    20     mins  51975;  Therapeutic Exercise:    16     mins  62488;     Neuromuscular Gumaro:        mins  41033;    Therapeutic Activity:    10      mins  90721;     Gait Training:           mins  49528;     Ultrasound:          mins  15817;    Electrical Stimulation:         mins  26293;  Iontophoresis     10     mins  74204    Untimed:  Electrical Stimulation:         mins  43992 ( );  Mechanical Traction:         mins  86370;   Fluidotherapy          mins  35697    Timed Treatment:   56   mins   Total Treatment:     56   mins        Neeta Brown PTA  Physical Therapist Assistant

## 2024-03-28 ENCOUNTER — TREATMENT (OUTPATIENT)
Dept: PHYSICAL THERAPY | Facility: CLINIC | Age: 59
End: 2024-03-28
Payer: COMMERCIAL

## 2024-03-28 DIAGNOSIS — M76.60 ACHILLES TENDON PAIN: Primary | ICD-10-CM

## 2024-03-28 PROCEDURE — 97112 NEUROMUSCULAR REEDUCATION: CPT | Performed by: PHYSICAL THERAPIST

## 2024-03-28 PROCEDURE — 97140 MANUAL THERAPY 1/> REGIONS: CPT | Performed by: PHYSICAL THERAPIST

## 2024-03-28 PROCEDURE — 97110 THERAPEUTIC EXERCISES: CPT | Performed by: PHYSICAL THERAPIST

## 2024-03-28 NOTE — PROGRESS NOTES
Physical Therapy Daily Treatment Note                  [address] [facility] [address]  [place] [address]      Patient: Abilio Wan   : 1965  Diagnosis/ICD-10 Code:  Achilles tendon pain [M76.60]  Referring practitioner: Ramon Wahl MD  Date of Initial Visit: Type: THERAPY  Noted: 3/20/2024  Today's Date: 3/28/2024  Patient seen for 3 sessions             Subjective   Abilio Wan reports: already seeing positive changes. It took a lot longer for the achilles to start aching with a long walk. Patch stayed on well for the 4 hours with no negative response to skin.     Objective          Tenderness   Left Ankle/Foot   Tenderness in the proximal Achilles.       See Exercise, Manual, and Modality Logs for complete treatment.       Assessment/Plan  Tenderness was more localized when palpating the achilles today. Continued IASTM before stretching and strengthening. Will continue iontophoresis at end of session to decrease inflammation. Progressions in treatment caused fatigue but no pain symptoms.   Progress per Plan of Care and Progress strengthening /stabilization /functional activity           Timed:  Manual Therapy:    12     mins  48205;  Therapeutic Exercise:    24     mins  64449;     Neuromuscular Gumaro:    10    mins  18367;    Therapeutic Activity:          mins  05188;     Gait Training:           mins  43757;     Ultrasound:          mins  35613;    Electrical Stimulation:         mins  58415;  Iontophoresis          mins  24543    Untimed:  Electrical Stimulation:         mins  08068 ( );  Mechanical Traction:         mins  29053;   Fluidotherapy          mins  42749    Timed Treatment:   46   mins   Total Treatment:     46   mins        Neeta Brown PTA  Physical Therapist Assistant

## 2024-04-10 ENCOUNTER — TREATMENT (OUTPATIENT)
Dept: PHYSICAL THERAPY | Facility: CLINIC | Age: 59
End: 2024-04-10
Payer: COMMERCIAL

## 2024-04-10 DIAGNOSIS — M76.60 ACHILLES TENDON PAIN: Primary | ICD-10-CM

## 2024-04-10 PROCEDURE — 97110 THERAPEUTIC EXERCISES: CPT | Performed by: PHYSICAL THERAPIST

## 2024-04-10 PROCEDURE — 97112 NEUROMUSCULAR REEDUCATION: CPT | Performed by: PHYSICAL THERAPIST

## 2024-04-10 NOTE — PROGRESS NOTES
Physical Therapy Daily Treatment Note  [address] [facility]  [address]   [place] [address]      Patient: Abilio Wan   : 1965  Referring practitioner: Ramon Wahl MD  Date of Initial Visit: Type: THERAPY  Noted: 3/20/2024  Today's Date: 4/10/2024  Patient seen for 4 sessions       Visit Diagnoses:    ICD-10-CM ICD-9-CM   1. Achilles tendon pain  M76.60 727.89       Subjective   Abilio Wan reports: Felt like he made improvements initially but not as much recently. Achilles tendon pn mild w/ prolonged walking, up to 2-3/10. No pn and minimal challenge w/ his HEP.    Pre tx pn score: 0  Post tx pn score: 0    Treatment  See Exercise, Manual, and Modality Logs for complete treatment.     Objective         Assessment & Plan       Assessment  Assessment details: Progressed calf/achilles loading exercises as outlined in chart. Also incorporated standing arch raise, resisted inversion to address increased pronation L foot vs R. Pt able to complete all w/o pn. Given relative ease w/ his HEP, discussed gradually increasing volume w/ eccentric heel raises, performing several sets of 20-30 reps if needed to induce fatigue and considering performing more than 1x/day to increase load tolerance. Pt verbalized understanding.    Plan  Plan details: Reassessment; try standing single leg heel raise          Timed:         Manual Therapy:    0     mins  55784;     Therapeutic Exercise:    25     mins  63662;     Neuromuscular Gumaro:    10    mins  89518;    Therapeutic Activity:     0     mins  52128;     Gait Trainin     mins  51753;     Ultrasound:     0     mins  53928;    Ionto                               3    mins   76283  Self Care                       0     mins   19919  Canalith Repos    0     mins 94380      Un-Timed:  Electrical Stimulation:    0     mins  84115 (MC );  Dry Needling     0     mins self-pay  Traction     0     mins 43921      Timed Treatment:   38   mins   Total  Treatment:     38   mins    Neeta España, PT  [place] License: #[id number]

## 2024-08-06 ENCOUNTER — OFFICE VISIT (OUTPATIENT)
Dept: FAMILY MEDICINE CLINIC | Facility: CLINIC | Age: 59
End: 2024-08-06
Payer: COMMERCIAL

## 2024-08-06 VITALS
SYSTOLIC BLOOD PRESSURE: 144 MMHG | RESPIRATION RATE: 16 BRPM | WEIGHT: 218 LBS | DIASTOLIC BLOOD PRESSURE: 88 MMHG | BODY MASS INDEX: 30.52 KG/M2 | HEIGHT: 71 IN | OXYGEN SATURATION: 98 % | HEART RATE: 91 BPM | TEMPERATURE: 98.1 F

## 2024-08-06 DIAGNOSIS — Z00.00 ANNUAL PHYSICAL EXAM: Primary | ICD-10-CM

## 2024-08-06 DIAGNOSIS — Z85.46 HISTORY OF PROSTATE CANCER: ICD-10-CM

## 2024-08-06 DIAGNOSIS — N52.31 ERECTILE DYSFUNCTION AFTER RADICAL PROSTATECTOMY: ICD-10-CM

## 2024-08-06 PROCEDURE — 99386 PREV VISIT NEW AGE 40-64: CPT | Performed by: FAMILY MEDICINE

## 2024-08-06 RX ORDER — SILDENAFIL 100 MG/1
50-100 TABLET, FILM COATED ORAL DAILY PRN
Qty: 30 TABLET | Refills: 11 | Status: SHIPPED | OUTPATIENT
Start: 2024-08-06

## 2024-08-07 NOTE — PROGRESS NOTES
Subjective   Abilio Wan is a 59 y.o. male    Chief Complaint    Annual physical exam  History of prostate cancer    History of Present Illness  History of Present Illness  The patient is a 59-year-old male who presents for annual physical exam, history of prostate cancer.  Patient has no acute complaints or concerns today.    The patient reports overall good health, with annual follow-ups to manage his prostate cancer. He denies experiencing any edema in his feet or ankles. He maintains an active lifestyle, engaging in treadmill exercises.      The following portions of the patient's history were reviewed and updated as appropriate: allergies, current medications, past social history and problem list    Review of Systems   Constitutional: Negative.  Negative for chills, fatigue, fever and unexpected weight change.   HENT: Negative.     Eyes: Negative.    Respiratory: Negative.  Negative for cough, chest tightness, shortness of breath and wheezing.    Cardiovascular: Negative.  Negative for chest pain, palpitations and leg swelling.   Gastrointestinal: Negative.  Negative for abdominal pain, nausea and vomiting.   Endocrine: Negative.  Negative for polydipsia, polyphagia and polyuria.   Genitourinary: Negative.  Negative for dysuria, frequency and urgency.   Musculoskeletal: Negative.  Negative for arthralgias, back pain and myalgias.   Skin: Negative.  Negative for color change and rash.   Allergic/Immunologic: Negative.    Neurological: Negative.  Negative for dizziness, syncope, weakness and headaches.   Hematological: Negative.  Negative for adenopathy. Does not bruise/bleed easily.   Psychiatric/Behavioral: Negative.     All other systems reviewed and are negative.      Objective     Vitals:    08/06/24 1450   BP: 144/88   Pulse: 91   Resp: 16   Temp: 98.1 °F (36.7 °C)   SpO2: 98%       Physical Exam  Vitals and nursing note reviewed.   Constitutional:       General: He is not in acute distress.      Appearance: Normal appearance. He is well-developed. He is not ill-appearing, toxic-appearing or diaphoretic.   HENT:      Head: Normocephalic and atraumatic.      Right Ear: Tympanic membrane and ear canal normal.      Left Ear: Tympanic membrane and ear canal normal.      Mouth/Throat:      Mouth: Mucous membranes are moist.      Pharynx: Oropharynx is clear.   Eyes:      General: No scleral icterus.     Conjunctiva/sclera: Conjunctivae normal.      Pupils: Pupils are equal, round, and reactive to light.   Neck:      Thyroid: No thyromegaly.      Vascular: No carotid bruit or JVD.   Cardiovascular:      Rate and Rhythm: Normal rate and regular rhythm.      Pulses: Normal pulses.      Heart sounds: Normal heart sounds. No murmur heard.  Pulmonary:      Effort: Pulmonary effort is normal. No respiratory distress.      Breath sounds: Normal breath sounds.   Abdominal:      General: Bowel sounds are normal.      Palpations: Abdomen is soft.      Tenderness: There is no abdominal tenderness.   Musculoskeletal:      Cervical back: Neck supple.      Right lower leg: No edema.      Left lower leg: No edema.   Lymphadenopathy:      Cervical: No cervical adenopathy.   Skin:     General: Skin is warm and dry.      Findings: No rash.   Neurological:      Mental Status: He is alert and oriented to person, place, and time.   Psychiatric:         Mood and Affect: Mood normal.         Behavior: Behavior normal.       Physical Exam      Assessment & Plan   Assessment & Plan  1. Annual physical exam.  A comprehensive blood workup, inclusive of cholesterol, glucose, and kidney function, will be conducted. The patient has been advised to maintain his exercise regimen. A refill of sildenafil has been provided.    Problems Addressed this Visit          Genitourinary and Reproductive     Erectile dysfunction after radical prostatectomy     Other Visit Diagnoses       Annual physical exam    -  Primary    Relevant Orders    CBC (No Diff)     Comprehensive Metabolic Panel    Lipid Panel    TSH    T4, Free    History of prostate cancer              Diagnoses         Codes Comments    Annual physical exam    -  Primary ICD-10-CM: Z00.00  ICD-9-CM: V70.0     History of prostate cancer     ICD-10-CM: Z85.46  ICD-9-CM: V10.46     Erectile dysfunction after radical prostatectomy     ICD-10-CM: N52.31  ICD-9-CM: 607.84           Preventive medicine discussed, diet, exercise, healthy living discussed at length.  Discussed nutrition, physical activity, healthy weight, injury prevention, misuse of tobacco, alcohol and drugs, dental health, mental health, immunizations, screening    Part of this note may be an electronic transcription/translation of spoken language to printed text using the Dragon Dictation System.

## 2024-08-08 ENCOUNTER — LAB (OUTPATIENT)
Dept: LAB | Facility: HOSPITAL | Age: 59
End: 2024-08-08
Payer: COMMERCIAL

## 2024-08-08 DIAGNOSIS — Z00.00 ANNUAL PHYSICAL EXAM: ICD-10-CM

## 2024-08-08 PROCEDURE — 36415 COLL VENOUS BLD VENIPUNCTURE: CPT

## 2024-08-08 PROCEDURE — 80050 GENERAL HEALTH PANEL: CPT

## 2024-08-08 PROCEDURE — 84439 ASSAY OF FREE THYROXINE: CPT

## 2024-08-08 PROCEDURE — 80061 LIPID PANEL: CPT

## 2024-08-09 LAB
ALBUMIN SERPL-MCNC: 4.4 G/DL (ref 3.5–5.2)
ALBUMIN/GLOB SERPL: 1.6 G/DL
ALP SERPL-CCNC: 83 U/L (ref 39–117)
ALT SERPL W P-5'-P-CCNC: 17 U/L (ref 1–41)
ANION GAP SERPL CALCULATED.3IONS-SCNC: 12.7 MMOL/L (ref 5–15)
AST SERPL-CCNC: 21 U/L (ref 1–40)
BILIRUB SERPL-MCNC: 0.3 MG/DL (ref 0–1.2)
BUN SERPL-MCNC: 13 MG/DL (ref 6–20)
BUN/CREAT SERPL: 13.4 (ref 7–25)
CALCIUM SPEC-SCNC: 9.2 MG/DL (ref 8.6–10.5)
CHLORIDE SERPL-SCNC: 105 MMOL/L (ref 98–107)
CHOLEST SERPL-MCNC: 183 MG/DL (ref 0–200)
CO2 SERPL-SCNC: 25.3 MMOL/L (ref 22–29)
CREAT SERPL-MCNC: 0.97 MG/DL (ref 0.76–1.27)
DEPRECATED RDW RBC AUTO: 37.6 FL (ref 37–54)
EGFRCR SERPLBLD CKD-EPI 2021: 89.9 ML/MIN/1.73
ERYTHROCYTE [DISTWIDTH] IN BLOOD BY AUTOMATED COUNT: 12.6 % (ref 12.3–15.4)
GLOBULIN UR ELPH-MCNC: 2.7 GM/DL
GLUCOSE SERPL-MCNC: 98 MG/DL (ref 65–99)
HCT VFR BLD AUTO: 45.7 % (ref 37.5–51)
HDLC SERPL-MCNC: 39 MG/DL (ref 40–60)
HGB BLD-MCNC: 15.6 G/DL (ref 13–17.7)
LDLC SERPL CALC-MCNC: 124 MG/DL (ref 0–100)
LDLC/HDLC SERPL: 3.13 {RATIO}
MCH RBC QN AUTO: 28.5 PG (ref 26.6–33)
MCHC RBC AUTO-ENTMCNC: 34.1 G/DL (ref 31.5–35.7)
MCV RBC AUTO: 83.4 FL (ref 79–97)
PLATELET # BLD AUTO: 274 10*3/MM3 (ref 140–450)
PMV BLD AUTO: 10 FL (ref 6–12)
POTASSIUM SERPL-SCNC: 4.7 MMOL/L (ref 3.5–5.2)
PROT SERPL-MCNC: 7.1 G/DL (ref 6–8.5)
RBC # BLD AUTO: 5.48 10*6/MM3 (ref 4.14–5.8)
SODIUM SERPL-SCNC: 143 MMOL/L (ref 136–145)
T4 FREE SERPL-MCNC: 1.3 NG/DL (ref 0.92–1.68)
TRIGL SERPL-MCNC: 109 MG/DL (ref 0–150)
TSH SERPL DL<=0.05 MIU/L-ACNC: 2.02 UIU/ML (ref 0.27–4.2)
VLDLC SERPL-MCNC: 20 MG/DL (ref 5–40)
WBC NRBC COR # BLD AUTO: 4.05 10*3/MM3 (ref 3.4–10.8)